# Patient Record
Sex: FEMALE | Race: BLACK OR AFRICAN AMERICAN | NOT HISPANIC OR LATINO | Employment: FULL TIME | URBAN - METROPOLITAN AREA
[De-identification: names, ages, dates, MRNs, and addresses within clinical notes are randomized per-mention and may not be internally consistent; named-entity substitution may affect disease eponyms.]

---

## 2017-08-02 ENCOUNTER — HOSPITAL ENCOUNTER (OUTPATIENT)
Dept: RADIOLOGY | Facility: OTHER | Age: 32
Discharge: HOME | End: 2017-08-02
Attending: SPECIALIST
Payer: COMMERCIAL

## 2017-08-02 DIAGNOSIS — D21.9 FIBROIDS: ICD-10-CM

## 2017-08-02 DIAGNOSIS — D21.9 FIBROIDS: Primary | ICD-10-CM

## 2017-08-04 ENCOUNTER — HOSPITAL ENCOUNTER (OUTPATIENT)
Dept: RADIOLOGY | Facility: OTHER | Age: 32
Discharge: HOME | End: 2017-08-04
Attending: SPECIALIST
Payer: COMMERCIAL

## 2017-08-04 DIAGNOSIS — D21.9 FIBROIDS: ICD-10-CM

## 2017-08-04 PROCEDURE — 76830 TRANSVAGINAL US NON-OB: CPT | Mod: 26,,, | Performed by: RADIOLOGY

## 2017-08-04 PROCEDURE — 76856 US EXAM PELVIC COMPLETE: CPT | Mod: TC

## 2017-08-04 PROCEDURE — 76856 US EXAM PELVIC COMPLETE: CPT | Mod: 26,,, | Performed by: RADIOLOGY

## 2017-08-31 ENCOUNTER — TELEPHONE (OUTPATIENT)
Dept: TRANSPLANT | Facility: CLINIC | Age: 32
End: 2017-08-31

## 2017-08-31 NOTE — TELEPHONE ENCOUNTER
----- Message from Keren Yu sent at 8/31/2017  9:04 AM CDT -----  Contact: patient   Calling to schedule her appt. Please call

## 2017-09-01 ENCOUNTER — TELEPHONE (OUTPATIENT)
Dept: TRANSPLANT | Facility: CLINIC | Age: 32
End: 2017-09-01

## 2017-09-01 NOTE — TELEPHONE ENCOUNTER
----- Message from Hanna Javed sent at 8/31/2017  4:18 PM CDT -----  Contact: Pt  Devora,    Pt would like to know if her records were received from Dr.Kelly Person's office?    Pt contact number [phone]  Thanks

## 2017-09-01 NOTE — TELEPHONE ENCOUNTER
----- Message from Hanna Javed sent at 9/1/2017  1:38 PM CDT -----  Contact: Pt  Pt would like to schedule an appt    Pt contact number [phone]  Thanks

## 2017-09-05 ENCOUNTER — DOCUMENTATION ONLY (OUTPATIENT)
Dept: TRANSPLANT | Facility: CLINIC | Age: 32
End: 2017-09-05

## 2017-09-05 NOTE — LETTER
September 5, 2017    Divina Ricci  [address] [facility] [address]      Dear Divina Ricci:    Your doctor has referred you to the [facility]. You will be contacted by our office and an initial appointment will then be scheduled for you.    We look forward to seeing you soon. If you have any further questions, please contact us at [phone].       Sincerely,        [facility]   [address] [place] [id number]  [phone]

## 2017-09-05 NOTE — NURSING
Pt records reviewed.  Pt will be referred to Hepatology due to cirrhosis with normal labs.   Initial referral received  from Rufina Person    Referral letter sent to provider and patient.  Pt records reviewed.

## 2017-10-03 ENCOUNTER — OFFICE VISIT (OUTPATIENT)
Dept: HEPATOLOGY | Facility: CLINIC | Age: 32
End: 2017-10-03
Payer: COMMERCIAL

## 2017-10-03 VITALS
RESPIRATION RATE: 18 BRPM | DIASTOLIC BLOOD PRESSURE: 103 MMHG | HEART RATE: 113 BPM | WEIGHT: 201.94 LBS | OXYGEN SATURATION: 99 % | TEMPERATURE: 98 F | HEIGHT: 66 IN | SYSTOLIC BLOOD PRESSURE: 155 MMHG | BODY MASS INDEX: 32.45 KG/M2

## 2017-10-03 DIAGNOSIS — R93.89 ABNORMAL FINDING ON IMAGING: Primary | ICD-10-CM

## 2017-10-03 PROCEDURE — 99999 PR PBB SHADOW E&M-EST. PATIENT-LVL IV: CPT | Mod: PBBFAC,,, | Performed by: INTERNAL MEDICINE

## 2017-10-03 PROCEDURE — 99204 OFFICE O/P NEW MOD 45 MIN: CPT | Mod: S$GLB,,, | Performed by: INTERNAL MEDICINE

## 2017-10-03 RX ORDER — NORETHINDRONE ACETATE AND ETHINYL ESTRADIOL 1MG-20(21)
1 KIT ORAL DAILY
Status: ON HOLD | COMMUNITY
End: 2019-03-11 | Stop reason: HOSPADM

## 2017-10-03 RX ORDER — LISINOPRIL 10 MG/1
10 TABLET ORAL DAILY
COMMUNITY
Start: 2017-09-27 | End: 2018-10-09

## 2017-10-03 RX ORDER — LEVOTHYROXINE SODIUM 200 UG/1
200 TABLET ORAL DAILY
COMMUNITY
End: 2018-10-09

## 2017-10-03 NOTE — PATIENT INSTRUCTIONS
Recommendations:  -  CBC, CMP in 3 months  -  U/s abd complete to evaluate for cirrhosis, ascites, focal lesion in the liver.   -  Fibroscan performed. Result as above.

## 2017-10-03 NOTE — PROGRESS NOTES
"[facility] New Patient (Self-Referral) Note    Reason for Visit:  The encounter diagnosis is "cirrhosis with ascites"    PCP:     HPI:  This is a 31 y.o. female here for evaluation of: "cirrhosis"      Patient had upper abdominal distention in the epigastric area about 3 months ago (July 2017), it would be constant, just became bigger after certain foods, like bread, or drank a lot of water.  She also had trouble emptying bowels.  She had a lot of constipation.  An ultrasound was done in the physician's office, which showed "evidence for cirrhosis with ascites".  She did not receive diuretics.  Was not on any particular diet.      She also had thyroid function tests and CBC, and was told she had hypothryoidism and anemia.  Was given synthroid 200 mcg daily before breakfast, currently takes it.  And she was sent to Dr. Lange, hematologist, for her anemia.  He gave her two IV iron infusions and oral iron.  Last time she went to him she was told to stop the iron because she had now too much iron.     Currently, feels much better, with more energy, constipation is gone, she can climb up stairs.  But epigastric fullness still persists.        Elevated liver enzymes: No  Abnormal imaging: Yes  Cirrhosis: Yes  Hepatitis C: No  Hepatitis B: No  Fatty liver: No  Encephalopathy: No  Post-hospital discharge: No  Symptoms: abdomen feels big in the epigastric area    Primary hepatic manifestations:  Fatigue:No  Edema:No  Ascites:No  Encephalopathy:No  Abdominal pain:No  GI bleeds: No  Pruritus:No  Weight Changes:No  Changes in Bowel habits: No  Muscle cramps:No    Risk factors for liver disease:  No jaundice  No transfusions  No IVDU  Did not snort cocaine or similar agents  Did not live with anyone with hepatitis B or C  Sexual partner not tested  No hepatotoxic medications  No exposure to industrial toxins  Alcohol: none       ROS:  Constitutional: No fevers, chills, weight changes, fatigue  ENT: No allergies, " nosebleeds,   CV: No chest pain  Pulm: No cough, shortness of breath  Ophtho: No vision changes  GI/Liver: see HPI  Derm: No rash, itching  Heme: No swollen glands, bruising  MSK: No joint pains, joint swelling  : No dysuria, hematuria, decrease in urine output  Endo: No hot or cold intolerance  Neuro: No confusion, disorientation, difficulty with sleep, memory, concentration, syncope, seizure  Psych: No anxiety, depression    Medical History:  has no past medical history on file.    Surgical History:  has no past surgical history on file.    Family History: family history is not on file..     Social History:  social    Current Outpatient Prescriptions   Medication Sig    Lactobacillus rhamnosus GG (CULTURELLE) 10 billion cell capsule Take 1 capsule by mouth once daily.    levothyroxine (SYNTHROID) 200 MCG tablet Take 200 mcg by mouth once daily.    lisinopril 10 MG tablet Take 10 mg by mouth once daily.     norethindrone-ethinyl estradiol (JUNEL FE 1/20) 1 mg-20 mcg (21)/75 mg (7) per tablet Take 1 tablet by mouth once daily.     No current facility-administered medications for this visit.        Objective Findings:    Vital Signs:  Vitals:    10/03/17 1429   BP: (!) 155/103   Pulse: (!) 113   Resp: 18   Temp: 97.9 °F (36.6 °C)           Physical Exam:  General Appearance: Well appearing in no acute distress  Head:   Normocephalic, without obvious abnormality  Eyes:    No scleral icterus, EOMI  ENT: Neck supple, Lips, mucosa, and tongue normal; teeth and gums normal  Lungs: CTA bilaterally in anterior and posterior fields, no wheezes, no crackles.  Heart:  Regular rate and rhythm, S1, S2 normal, no murmurs heard  Abdomen: Soft, non tender, non distended with positive bowel sounds in all four quadrants. No hepatosplenomegaly, ascites, or mass  Extremities: 2+ pulses, no clubbing, cyanosis or edema  Skin: No rash  Neurologic: CN II-XII intact      Labs:  No results found for this basename: wbc,  hgb,  hct,   "plt,  chol,  trig,  hdl,  ldldirect,  inr,  creatinine,  bun,  bilitot,  alt,  ast,  alkphos,  na,  k,  cl,  co2,  tsh,  psa,  gluf,  hgba1c,  microalbur,  afp       Imaging:   Pelvic ultrasound - enlarged uterus with multiple fibroids  Outside ultrasound: 8/2417: cirrhosis with ascites    Endoscopy:    none    Assessment:      -  Epigastric fullness, outside u/s reported "liver shows evidence for cirrhosis with ascites".  Liver chemistries completely normal.  Fullness persists but no increase after meals. Improved symptoms after thyroid replacement done.  I believe hypothryroid state caused her symptoms of fatigue, constipation, possibly gatsric emptying problem.  Doubt she has cirrhosis, as shown by fibroscan below.   Fibroscan today shows: kPa 7.1, 14% IQR/med, F0-F1 (closer to F0)  And  = S0, <11% fat   Father has autoimmune hepatitis, therefore, will recheck enzymes in 3 months.     -  Hypothyroidism much improved with supplemental synthroid.   kofi check fibroscan, and if indicates singinificant fibrosis or cirrhosis, will get autoimmune markers, her father hads autoimmune hepatitis.      Recommendations:  -  CBC, CMP in 3 months  -  U/s abd complete to evaluate for cirrhosis, ascites, focal lesion in the liver.   -  Fibroscan performed. Result as above.      No Follow-up on file.      Order summary:  No orders of the defined types were placed in this encounter.           Nancy Lam MD  "

## 2017-10-03 NOTE — Clinical Note
Recommendations: -  CBC, CMP in 3 months -  U/s abd complete to evaluate for cirrhosis, ascites, focal lesion in the liver.  -  Fibroscan performed. Result as above. -  Return in 6 months

## 2017-10-13 ENCOUNTER — HOSPITAL ENCOUNTER (OUTPATIENT)
Dept: RADIOLOGY | Facility: HOSPITAL | Age: 32
Discharge: HOME | End: 2017-10-13
Attending: INTERNAL MEDICINE
Payer: COMMERCIAL

## 2017-10-13 DIAGNOSIS — R93.89 ABNORMAL FINDING ON IMAGING: ICD-10-CM

## 2017-10-13 PROCEDURE — 76700 US EXAM ABDOM COMPLETE: CPT | Mod: TC

## 2017-10-13 PROCEDURE — 76700 US EXAM ABDOM COMPLETE: CPT | Mod: 26,,, | Performed by: RADIOLOGY

## 2017-10-15 ENCOUNTER — TELEPHONE (OUTPATIENT)
Dept: HEPATOLOGY | Facility: CLINIC | Age: 32
End: 2017-10-15

## 2017-10-15 DIAGNOSIS — R19.00 PELVIC MASS IN FEMALE: Primary | ICD-10-CM

## 2017-10-15 NOTE — TELEPHONE ENCOUNTER
Please inform patient, there is no evidence of cirrhosis based on this u/s, but there is a small polyp in the gallbladder, fluid in the abdomen and a pelvic mass which could be a fibroid or from the ovary.     Needs CA-125, CT of abd and pelvis, paracentesis with fluid analysis, and she needs to see her GYN doctor ASAP for pelvic mass, may need to stop hormone pills.  Pl schedule lab appt, CT, and paracentesis with IR and ascites fluid to be sent for cell count, diff, cytology, total protein, albumin, culture. Orders placed

## 2017-10-16 ENCOUNTER — TELEPHONE (OUTPATIENT)
Dept: HEPATOLOGY | Facility: CLINIC | Age: 32
End: 2017-10-16

## 2017-10-16 NOTE — TELEPHONE ENCOUNTER
MA called patient to inform her that we have schedule her PARA, CT and labs 10/23/17 all in the same day. Patient accepted the appt. Remind patient to fast 4 hours. Patient understood. HENRY

## 2017-10-16 NOTE — TELEPHONE ENCOUNTER
----- Message from Nancy Lam MD sent at 10/15/2017  5:14 AM CDT -----  Please inform patient, there is no evidence of cirrhosis based on this u/s, but there is a small polyp in the gallbladder, fluid in the abdomen and a pelvic mass which could be a fibroid or from the ovary. Needs CA-125, CT of abd and pelvis, paracentesis with fluid analysis, and she needs to see her GYN doctor.  Pl schedule paracentesis with IR and ascites fluid to be sent for cell count, diff, cytology, total protein, albumin, culture.

## 2017-10-16 NOTE — TELEPHONE ENCOUNTER
MA called patient of her Ultrasound results below. Patient understood she would like to schedule her PARA, CT and labs all in the same day.     She will also see her OB GYN.     MA called IR to schedule her PARA> no answer left them VM to please call me back. HENRY

## 2017-10-19 DIAGNOSIS — R18.8 OTHER ASCITES: Primary | ICD-10-CM

## 2017-10-20 DIAGNOSIS — Z01.818 PRE-PROCEDURAL EXAMINATION: Primary | ICD-10-CM

## 2017-10-23 ENCOUNTER — HOSPITAL ENCOUNTER (OUTPATIENT)
Dept: RADIOLOGY | Facility: HOSPITAL | Age: 32
Discharge: HOME | End: 2017-10-23
Attending: INTERNAL MEDICINE
Payer: COMMERCIAL

## 2017-10-23 ENCOUNTER — TELEPHONE (OUTPATIENT)
Dept: HEPATOLOGY | Facility: CLINIC | Age: 32
End: 2017-10-23

## 2017-10-23 ENCOUNTER — HOSPITAL ENCOUNTER (OUTPATIENT)
Dept: INTERVENTIONAL RADIOLOGY/VASCULAR | Facility: HOSPITAL | Age: 32
Discharge: HOME | End: 2017-10-23
Attending: INTERNAL MEDICINE
Payer: COMMERCIAL

## 2017-10-23 VITALS
OXYGEN SATURATION: 100 % | RESPIRATION RATE: 16 BRPM | HEART RATE: 105 BPM | SYSTOLIC BLOOD PRESSURE: 147 MMHG | DIASTOLIC BLOOD PRESSURE: 88 MMHG

## 2017-10-23 DIAGNOSIS — R19.00 PELVIC MASS IN FEMALE: ICD-10-CM

## 2017-10-23 LAB
ALBUMIN FLD-MCNC: 3.1 G/DL
APPEARANCE FLD: NORMAL
B-HCG UR QL: NEGATIVE
BODY FLD TYPE: NORMAL
COLOR FLD: NORMAL
CREAT SERPL-MCNC: 0.7 MG/DL (ref 0.5–1.4)
CTP QC/QA: YES
EOSINOPHIL NFR FLD MANUAL: 1 %
LYMPHOCYTES NFR FLD MANUAL: 43 %
MESOTHL CELL NFR FLD MANUAL: 3 %
MONOS+MACROS NFR FLD MANUAL: 52 %
NEUTROPHILS NFR FLD MANUAL: 1 %
PROT FLD-MCNC: 6.1 G/DL
SAMPLE: NORMAL
SPECIMEN SOURCE: NORMAL
SPECIMEN SOURCE: NORMAL
WBC # FLD: 235 /CU MM

## 2017-10-23 PROCEDURE — 88112 CYTOPATH CELL ENHANCE TECH: CPT | Mod: 26,,, | Performed by: PATHOLOGY

## 2017-10-23 PROCEDURE — 89051 BODY FLUID CELL COUNT: CPT

## 2017-10-23 PROCEDURE — 25500020 PHARM REV CODE 255: Performed by: INTERNAL MEDICINE

## 2017-10-23 PROCEDURE — 49083 ABD PARACENTESIS W/IMAGING: CPT | Mod: ,,, | Performed by: FAMILY MEDICINE

## 2017-10-23 PROCEDURE — C1729 CATH, DRAINAGE: HCPCS

## 2017-10-23 PROCEDURE — 82042 OTHER SOURCE ALBUMIN QUAN EA: CPT

## 2017-10-23 PROCEDURE — 88305 TISSUE EXAM BY PATHOLOGIST: CPT | Performed by: PATHOLOGY

## 2017-10-23 PROCEDURE — 88305 TISSUE EXAM BY PATHOLOGIST: CPT | Mod: 26,,, | Performed by: PATHOLOGY

## 2017-10-23 PROCEDURE — 74177 CT ABD & PELVIS W/CONTRAST: CPT | Mod: 26,,, | Performed by: INTERNAL MEDICINE

## 2017-10-23 PROCEDURE — A7048 VACUUM DRAIN BOTTLE/TUBE KIT: HCPCS

## 2017-10-23 PROCEDURE — 87070 CULTURE OTHR SPECIMN AEROBIC: CPT

## 2017-10-23 PROCEDURE — 84157 ASSAY OF PROTEIN OTHER: CPT

## 2017-10-23 PROCEDURE — 74177 CT ABD & PELVIS W/CONTRAST: CPT | Mod: TC

## 2017-10-23 RX ADMIN — IOHEXOL 15 ML: 350 INJECTION, SOLUTION INTRAVENOUS at 03:10

## 2017-10-23 RX ADMIN — IOHEXOL 100 ML: 350 INJECTION, SOLUTION INTRAVENOUS at 04:10

## 2017-10-23 NOTE — H&P
Radiology History & Physical      SUBJECTIVE:     Chief Complaint: ascites     History of Present Illness:  Divina Ricci is a 31 y.o. female who presents for ultrasound guided paracentesis  Past Medical History:   Diagnosis Date    Anemia     Hypothyroid      No past surgical history on file.    Home Meds:   Prior to Admission medications    Medication Sig Start Date End Date Taking? Authorizing Provider   Lactobacillus rhamnosus GG (CULTURELLE) 10 billion cell capsule Take 1 capsule by mouth once daily.    Historical Provider, MD   levothyroxine (SYNTHROID) 200 MCG tablet Take 200 mcg by mouth once daily.    Historical Provider, MD   lisinopril 10 MG tablet Take 10 mg by mouth once daily.  9/27/17   Historical Provider, MD   norethindrone-ethinyl estradiol (JUNEL FE 1/20) 1 mg-20 mcg (21)/75 mg (7) per tablet Take 1 tablet by mouth once daily.    Historical Provider, MD     Anticoagulants/Antiplatelets: no anticoagulation    Allergies: Review of patient's allergies indicates:  No Known Allergies  Sedation History:  no adverse reactions    Review of Systems:   Hematological: no known coagulopathies  Respiratory: no shortness of breath  Cardiovascular: no chest pain  Gastrointestinal: no abdominal pain  Genito-Urinary: no dysuria  Musculoskeletal: negative  Neurological: no TIA or stroke symptoms         OBJECTIVE:     Vital Signs (Most Recent)  Pulse: (!) 122 (10/23/17 1118)  Resp: 18 (10/23/17 1118)  BP: (!) 143/97 (10/23/17 1118)  SpO2: 100 % (10/23/17 1118)    Physical Exam:  ASA: 2  Mallampati: n/a    General: no acute distress  Mental Status: alert and oriented to person, place and time  HEENT: normocephalic, atraumatic  Chest: unlabored breathing  Heart: regular heart rate  Abdomen: distended  Extremity: moves all extremities    Laboratory  Lab Results   Component Value Date    INR 1.0 10/23/2017       Lab Results   Component Value Date    WBC 3.98 10/23/2017    HGB 12.7 10/23/2017    HCT 39.2  10/23/2017    MCV 80 (L) 10/23/2017     (H) 10/23/2017    No results found for: GLU, NA, K, CL, CO2, BUN, CREATININE, CALCIUM, MG, ALT, AST, ALBUMIN, BILITOT, BILIDIR    ASSESSMENT/PLAN:     Sedation Plan: local  Patient will undergo ultrasound guided paracentesis.    SMITH Agrawal, FNP  Interventional Radiology  [phone] [employer]

## 2017-10-23 NOTE — PROCEDURES
Radiology Post-Procedure Note    Pre Op Diagnosis: Ascites  Post Op Diagnosis: Same    Procedure: Ultrasound Guided Paracentesis    Procedure performed by: Nikki DAVIDSON, Shyanne     Written Informed Consent Obtained: Yes  Specimen Removed: YES bloody  Estimated Blood Loss: Minimal    Findings:   Successful paracentesis.  Albumin administered PRN per protocol.    Patient tolerated procedure well.    Shyanne Jordan, APRN, FNP  Interventional Radiology  [phone] [employer]

## 2017-10-23 NOTE — PROGRESS NOTES
Paracentesis complete, 4100 MLs removed. Specimen sent to lab. l. Dressing applied to Right abdomen puncture site, dressing clean dry and intact. Pt given discharge instructions and handouts, pt verbalizes understanding. Questions answered. Pt denies pain and discomfort. Pt refusing transport. Pt ambulated to [facility] for transport home with family. Gait steady.

## 2017-10-24 ENCOUNTER — TELEPHONE (OUTPATIENT)
Dept: HEPATOLOGY | Facility: CLINIC | Age: 32
End: 2017-10-24

## 2017-10-24 NOTE — TELEPHONE ENCOUNTER
----- Message from Nancy Lam MD sent at 10/23/2017 10:51 PM CDT -----  Tumor marker just above normal, not significant.

## 2017-10-25 ENCOUNTER — TELEPHONE (OUTPATIENT)
Dept: HEPATOLOGY | Facility: CLINIC | Age: 32
End: 2017-10-25

## 2017-10-25 NOTE — TELEPHONE ENCOUNTER
----- Message from Nancy Lam MD sent at 10/23/2017 10:47 PM CDT -----  Pl inform pt:  Abdominal fluid was blood tinged. Cell counts not high enough to call bacterial peritonitis (bacterial infection in the abdomen). Cytology not reported, pending looks like.

## 2017-10-27 ENCOUNTER — TELEPHONE (OUTPATIENT)
Dept: HEPATOLOGY | Facility: CLINIC | Age: 32
End: 2017-10-27

## 2017-10-27 NOTE — TELEPHONE ENCOUNTER
----- Message from Nancy Lam MD sent at 10/27/2017  5:40 AM CDT -----  Fluid: Neg culture   Pl inform pt;  No infection in the fluid.   Cytology pending.

## 2017-10-27 NOTE — TELEPHONE ENCOUNTER
----- Message from Nancy Lam MD sent at 10/27/2017  4:56 AM CDT -----  Pl inform pt: CT showed uterine fibroids, and fluid.  Please have her see her GYN doctor to see if they think fluid could be due to fibroids or is there another reason.  Liver looks OK, don't think it is causing fluid.

## 2017-10-27 NOTE — TELEPHONE ENCOUNTER
----- Message from José Juárez sent at 10/26/2017  2:44 PM CDT -----  Contact: pt  Pt called to get CT exam results     [phone]    Thanks

## 2017-10-27 NOTE — TELEPHONE ENCOUNTER
Pt informed of this today and she is seeing her GYN doc next Tuesday and will mention this over to them at that time.

## 2017-10-28 LAB — BACTERIA FLD CULT: NORMAL

## 2017-10-31 ENCOUNTER — TELEPHONE (OUTPATIENT)
Dept: HEPATOLOGY | Facility: CLINIC | Age: 32
End: 2017-10-31

## 2017-10-31 DIAGNOSIS — D21.9 FIBROIDS: ICD-10-CM

## 2017-10-31 DIAGNOSIS — N83.00 FOLLICULAR CYST OF OVARY: Primary | ICD-10-CM

## 2017-10-31 NOTE — TELEPHONE ENCOUNTER
----- Message from Nancy Lam MD sent at 10/30/2017  9:32 PM CDT -----  Pl inform pt:  Fluid not infected.  Cytology of fluid still in process.

## 2017-11-03 ENCOUNTER — HOSPITAL ENCOUNTER (OUTPATIENT)
Dept: RADIOLOGY | Facility: OTHER | Age: 32
Discharge: HOME | End: 2017-11-03
Attending: SPECIALIST
Payer: COMMERCIAL

## 2017-11-03 DIAGNOSIS — N83.00 FOLLICULAR CYST OF OVARY: ICD-10-CM

## 2017-11-03 DIAGNOSIS — D21.9 FIBROIDS: ICD-10-CM

## 2017-11-03 PROCEDURE — 76856 US EXAM PELVIC COMPLETE: CPT | Mod: TC

## 2017-11-03 PROCEDURE — 76856 US EXAM PELVIC COMPLETE: CPT | Mod: 26,,, | Performed by: RADIOLOGY

## 2017-11-03 PROCEDURE — 76830 TRANSVAGINAL US NON-OB: CPT | Mod: 26,,, | Performed by: RADIOLOGY

## 2017-11-15 ENCOUNTER — HOSPITAL ENCOUNTER (OUTPATIENT)
Dept: RADIOLOGY | Facility: OTHER | Age: 32
Discharge: HOME | End: 2017-11-15
Attending: SPECIALIST
Payer: COMMERCIAL

## 2017-11-15 DIAGNOSIS — D25.9 UTERINE FIBROID: ICD-10-CM

## 2017-11-15 PROCEDURE — 25500020 PHARM REV CODE 255: Performed by: SPECIALIST

## 2017-11-15 PROCEDURE — 72197 MRI PELVIS W/O & W/DYE: CPT | Mod: TC

## 2017-11-15 PROCEDURE — 72197 MRI PELVIS W/O & W/DYE: CPT | Mod: 26,,, | Performed by: RADIOLOGY

## 2017-11-15 PROCEDURE — A9585 GADOBUTROL INJECTION: HCPCS | Performed by: SPECIALIST

## 2017-11-15 RX ORDER — GADOBUTROL 604.72 MG/ML
9 INJECTION INTRAVENOUS
Status: COMPLETED | OUTPATIENT
Start: 2017-11-15 | End: 2017-11-15

## 2017-11-15 RX ADMIN — GADOBUTROL 9 ML: 604.72 INJECTION INTRAVENOUS at 10:11

## 2018-01-29 ENCOUNTER — TELEPHONE (OUTPATIENT)
Dept: HEPATOLOGY | Facility: CLINIC | Age: 33
End: 2018-01-29

## 2018-01-30 ENCOUNTER — TELEPHONE (OUTPATIENT)
Dept: HEPATOLOGY | Facility: CLINIC | Age: 33
End: 2018-01-30

## 2018-01-30 NOTE — TELEPHONE ENCOUNTER
MA called patient again she still unable to reached left her VM to please give us a callback. HENRY

## 2018-01-30 NOTE — TELEPHONE ENCOUNTER
----- Message from Nancy Lam MD sent at 1/26/2018  9:53 AM CST -----  Pl inform patient the acites fluid cytology report from 10/26/17 was sent to me today, not sure why it's taken so long, but good news is: there were no cancer cells in the fluid.

## 2018-01-31 ENCOUNTER — TELEPHONE (OUTPATIENT)
Dept: HEPATOLOGY | Facility: CLINIC | Age: 33
End: 2018-01-31

## 2018-01-31 NOTE — TELEPHONE ENCOUNTER
Message from Dr Lam;  MD PAWEL James [facility] Clinical Staff   Pl inform patient the acites fluid cytology report from 10/26/17 was sent to me today, not sure why it's taken so long, but good news is: there were no cancer cells in the fluid.      Patient called and message relayed from Dr Lam.   Patient stated she was glad to get the good news. Voiced understanding.

## 2018-08-07 ENCOUNTER — TELEPHONE (OUTPATIENT)
Dept: HEPATOLOGY | Facility: CLINIC | Age: 33
End: 2018-08-07

## 2018-08-07 NOTE — TELEPHONE ENCOUNTER
----- Message from Jasmin Hahn sent at 8/3/2018 11:37 AM CDT -----  Contact: emily Frederick  Patient was discharge from the hospital on 08/02/2018 and was told to schedule appointment with her Dr Lam patient may need biopsy done.                                     Thanks,  Jasmin  ----- Message -----  From: Tracie Sharpe  Sent: 8/3/2018  11:22 AM  To: Hepatology Scheduling    Needs Advice    Reason for call: called to schedule appt  Communication Preference: [phone]  Additional Information: pt

## 2018-10-09 ENCOUNTER — TELEPHONE (OUTPATIENT)
Dept: HEPATOLOGY | Facility: CLINIC | Age: 33
End: 2018-10-09

## 2018-10-09 ENCOUNTER — OFFICE VISIT (OUTPATIENT)
Dept: HEPATOLOGY | Facility: CLINIC | Age: 33
End: 2018-10-09
Payer: COMMERCIAL

## 2018-10-09 VITALS
BODY MASS INDEX: 29.83 KG/M2 | WEIGHT: 190.06 LBS | HEIGHT: 67 IN | DIASTOLIC BLOOD PRESSURE: 80 MMHG | OXYGEN SATURATION: 100 % | HEART RATE: 119 BPM | SYSTOLIC BLOOD PRESSURE: 132 MMHG

## 2018-10-09 DIAGNOSIS — D21.9 FIBROIDS: ICD-10-CM

## 2018-10-09 DIAGNOSIS — E03.9 HYPOTHYROIDISM, UNSPECIFIED TYPE: ICD-10-CM

## 2018-10-09 DIAGNOSIS — R18.8 OTHER ASCITES: Primary | ICD-10-CM

## 2018-10-09 PROCEDURE — 99213 OFFICE O/P EST LOW 20 MIN: CPT | Mod: S$GLB,,, | Performed by: INTERNAL MEDICINE

## 2018-10-09 PROCEDURE — 99999 PR PBB SHADOW E&M-EST. PATIENT-LVL III: CPT | Mod: PBBFAC,,, | Performed by: INTERNAL MEDICINE

## 2018-10-09 RX ORDER — METOPROLOL SUCCINATE 25 MG/1
25 TABLET, EXTENDED RELEASE ORAL 2 TIMES DAILY
Refills: 0 | COMMUNITY
Start: 2018-09-26 | End: 2019-02-26 | Stop reason: CLARIF

## 2018-10-09 RX ORDER — LEVOTHYROXINE SODIUM 137 UG/1
137 TABLET ORAL DAILY
Refills: 0 | COMMUNITY
Start: 2018-08-28 | End: 2019-04-11 | Stop reason: DRUGHIGH

## 2018-10-09 RX ORDER — AMLODIPINE BESYLATE 5 MG/1
5 TABLET ORAL NIGHTLY
Refills: 1 | COMMUNITY
Start: 2018-09-24

## 2018-10-09 NOTE — PATIENT INSTRUCTIONS
Recommendations:  -  CBC, CMP in 3 months  -  TJ liver biopsy with venous pressure measuremnets to evaluate for cirrhosis, ascites.   - return in 4 weeks

## 2018-10-09 NOTE — PROGRESS NOTES
"[facility] New Patient (Self-Referral) Note    Reason for Visit:  The encounter diagnosis is "cirrhosis with ascites"    PCP:     Interval history:  Patient had presented with ascites in 2017, which was not consistent with portal hypertension. Fibroscan showed minimal fibrosis, platelet count was normal.  Ascites had more lymphocytes than polymorphs.  I had sent her to the gyn mD because of fibroids. Ascites was drained and did npot return till June 2018.   Subsequently she was seen by her endocrinologist for hypothyroid, during that visit, she was noted to be tachycardic.  Was admitted to [facility] on 8/1/2018 for supraventricular tachycardia, was taken off liothyronine but continued synthroid.  Also was started on metoprolol.  On a long term basis, has been on amlodipine, birth control pills.       At [facility], Ascites was drained and fluid again was not infected.  She is sent here for a liver biopsy.    HPI:  This is a 31 y.o. female here for evaluation of: "cirrhosis"      Patient had upper abdominal distention in the epigastric area about 3 months ago (July 2017), it would be constant, just became bigger after certain foods, like bread, or drank a lot of water.  She also had trouble emptying bowels.  She had a lot of constipation.  An ultrasound was done in the physician's office, which showed "evidence for cirrhosis with ascites".  She did not receive diuretics.  Was not on any particular diet.      She also had thyroid function tests and CBC, and was told she had hypothryoidism and anemia.  Was given synthroid 200 mcg daily before breakfast, currently takes it.  And she was sent to Dr. Lange, hematologist, for her anemia.  He gave her two IV iron infusions and oral iron.  Last time she went to him she was told to stop the iron because she had now too much iron.     Currently, feels much better, with more energy, constipation is gone, she can climb up stairs.  But epigastric fullness still persists.  "       Elevated liver enzymes: No  Abnormal imaging: Yes  Cirrhosis: Yes  Hepatitis C: No  Hepatitis B: No  Fatty liver: No  Encephalopathy: No  Post-hospital discharge: No  Symptoms: abdomen feels big in the epigastric area    Primary hepatic manifestations:  Fatigue:No  Edema:No  Ascites:No  Encephalopathy:No  Abdominal pain:No  GI bleeds: No  Pruritus:No  Weight Changes:No  Changes in Bowel habits: No  Muscle cramps:No    Risk factors for liver disease:  No jaundice  No transfusions  No IVDU  Did not snort cocaine or similar agents  Did not live with anyone with hepatitis B or C  Sexual partner not tested  No hepatotoxic medications  No exposure to industrial toxins  Alcohol: none       ROS:  Constitutional: No fevers, chills, weight changes, fatigue  ENT: No allergies, nosebleeds,   CV: No chest pain  Pulm: No cough, shortness of breath  Ophtho: No vision changes  GI/Liver: see HPI  Derm: No rash, itching  Heme: No swollen glands, bruising  MSK: No joint pains, joint swelling  : No dysuria, hematuria, decrease in urine output  Endo: No hot or cold intolerance  Neuro: No confusion, disorientation, difficulty with sleep, memory, concentration, syncope, seizure  Psych: No anxiety, depression    Medical History:  has no past medical history on file.    Surgical History:  has no past surgical history on file.    Family History: family history is not on file..     Social History:  social    Current Outpatient Medications   Medication Sig    norethindrone-ethinyl estradiol (JUNEL FE 1/20) 1 mg-20 mcg (21)/75 mg (7) per tablet Take 1 tablet by mouth once daily.    amLODIPine (NORVASC) 5 MG tablet Take 5 mg by mouth every evening.    levothyroxine (SYNTHROID) 137 MCG Tab tablet Take 137 mcg by mouth once daily.    metoprolol succinate (TOPROL-XL) 25 MG 24 hr tablet Take 25 mg by mouth 2 (two) times daily.     No current facility-administered medications for this visit.        Objective Findings:    Vital  "Signs:  Vitals:    10/09/18 1437   BP: 132/80   Pulse: (!) 119           Physical Exam:  General Appearance: Well appearing in no acute distress  Head:   Normocephalic, without obvious abnormality  Eyes:    No scleral icterus, EOMI  ENT: Neck supple, Lips, mucosa, and tongue normal; teeth and gums normal  Lungs: CTA bilaterally in anterior and posterior fields, no wheezes, no crackles.  Heart:  Regular rate and rhythm, S1, S2 normal, no murmurs heard  Abdomen: Soft, non tender, non distended with positive bowel sounds in all four quadrants. No hepatosplenomegaly, ascites, or mass  Extremities: 2+ pulses, no clubbing, cyanosis or edema  Skin: No rash  Neurologic: CN II-XII intact      Labs:  No results found for this basename: wbc,  hgb,  hct,  plt,  chol,  trig,  hdl,  ldldirect,  inr,  creatinine,  bun,  bilitot,  alt,  ast,  alkphos,  na,  k,  cl,  co2,  tsh,  psa,  gluf,  hgba1c,  microalbur,  afp       Imaging:   Pelvic ultrasound - enlarged uterus with multiple fibroids  Outside ultrasound: 8/2417: cirrhosis with ascites    Endoscopy:    none    Assessment:      -  Patient has had two episodes of ascites needing paracenteses.  Fluid was not consistent with cirrhosis.  Also, Elastography only showed minimal to no hepatic fibrosis.  Outside u/s reported "liver shows evidence for cirrhosis with ascites".  Liver chemistries completely normal.   Improved symptoms after thyroid replacement done.  I believe hypothryroid state may have caused her symptoms of fatigue, constipation, possibly gatsric emptying problem.  Doubt she has cirrhosis, as shown by fibroscan below.   Fibroscan today shows: kPa 7.1, 14% IQR/med, F0-F1 (closer to F0)  And  = S0, <11% fat     Patient here for a liver biopsy.      Father has autoimmune hepatitis, therefore, will recheck enzymes in 3 months.     -  Hypothyroidism much improved with supplemental synthroid.   If indicates singinificant fibrosis or cirrhosis, will get autoimmune " markers, her father hads autoimmune hepatitis.      Recommendations:  -  CBC, CMP in 3 months  -  TJ liver biopsy with venous pressure measuremnets to evaluate for cirrhosis, ascites.   - return in 4 weeks      Order summary:  No orders of the defined types were placed in this encounter.           Nancy Lam MD

## 2018-10-09 NOTE — Clinical Note
Recommendations:-  CBC, CMP in 3 months-  TJ liver biopsy with venous pressure measuremnets to evaluate for cirrhosis, ascites. - return in 4 weeks

## 2018-10-09 NOTE — TELEPHONE ENCOUNTER
Patient seen in clinic today 10/9/18 with Dr Nancy Lam. The patient will need a TJ Liver Biopsy.  Patient agreed to do the Procedure.  Pre and Post Procedure teaching done with the patient and her mother.  Written instructions given to the patient also.   Allowed to verbalize concerns. Questions answered.  The patient would like to do the Liver Biopsy done on any Thurs starting next week on 10/18/18 at the earliest check in for 6:30 am.  Verbalized understanding.  Voicemail Message left with IR for a tentative appt date.

## 2018-10-10 ENCOUNTER — TELEPHONE (OUTPATIENT)
Dept: HEPATOLOGY | Facility: CLINIC | Age: 33
End: 2018-10-10

## 2018-10-10 DIAGNOSIS — R18.8 OTHER ASCITES: Primary | ICD-10-CM

## 2018-10-10 NOTE — TELEPHONE ENCOUNTER
Received call from IR with approval for the patients requested date to do the TJ Liver Biopsy. Thursday 10/18/18 with Check in at 6:30 am.    Patient called. Date and time is acceptable. Pre Procedure teaching reinforced. Verbalized understanding and agreed.  Appt letter placed in the mail.

## 2018-10-16 ENCOUNTER — TELEPHONE (OUTPATIENT)
Dept: HEPATOLOGY | Facility: CLINIC | Age: 33
End: 2018-10-16

## 2018-10-17 ENCOUNTER — TELEPHONE (OUTPATIENT)
Dept: INTERVENTIONAL RADIOLOGY/VASCULAR | Facility: HOSPITAL | Age: 33
End: 2018-10-17

## 2018-10-17 VITALS — BODY MASS INDEX: 29.66 KG/M2 | HEIGHT: 67 IN | WEIGHT: 189 LBS

## 2018-10-18 ENCOUNTER — HOSPITAL ENCOUNTER (OUTPATIENT)
Facility: HOSPITAL | Age: 33
Discharge: HOME | End: 2018-10-18
Attending: INTERNAL MEDICINE | Admitting: INTERNAL MEDICINE
Payer: COMMERCIAL

## 2018-10-18 VITALS
BODY MASS INDEX: 29.66 KG/M2 | SYSTOLIC BLOOD PRESSURE: 116 MMHG | HEART RATE: 72 BPM | DIASTOLIC BLOOD PRESSURE: 86 MMHG | WEIGHT: 189 LBS | OXYGEN SATURATION: 100 % | RESPIRATION RATE: 18 BRPM | HEIGHT: 67 IN | TEMPERATURE: 98 F

## 2018-10-18 DIAGNOSIS — R18.8 OTHER ASCITES: ICD-10-CM

## 2018-10-18 DIAGNOSIS — R16.0 LIVER MASS: ICD-10-CM

## 2018-10-18 LAB
BASOPHILS # BLD AUTO: 0.05 K/UL
BASOPHILS NFR BLD: 1.3 %
DIFFERENTIAL METHOD: ABNORMAL
EOSINOPHIL # BLD AUTO: 0.2 K/UL
EOSINOPHIL NFR BLD: 4.9 %
ERYTHROCYTE [DISTWIDTH] IN BLOOD BY AUTOMATED COUNT: 16 %
HCT VFR BLD AUTO: 37.9 %
HGB BLD-MCNC: 11.5 G/DL
IMM GRANULOCYTES # BLD AUTO: 0.01 K/UL
IMM GRANULOCYTES NFR BLD AUTO: 0.3 %
INR PPP: 1
LYMPHOCYTES # BLD AUTO: 2 K/UL
LYMPHOCYTES NFR BLD: 51.5 %
MCH RBC QN AUTO: 23.1 PG
MCHC RBC AUTO-ENTMCNC: 30.3 G/DL
MCV RBC AUTO: 76 FL
MONOCYTES # BLD AUTO: 0.4 K/UL
MONOCYTES NFR BLD: 9 %
NEUTROPHILS # BLD AUTO: 1.3 K/UL
NEUTROPHILS NFR BLD: 33 %
NRBC BLD-RTO: 0 /100 WBC
PLATELET # BLD AUTO: 434 K/UL
PMV BLD AUTO: 10.1 FL
PROTHROMBIN TIME: 10.5 SEC
RBC # BLD AUTO: 4.98 M/UL
WBC # BLD AUTO: 3.88 K/UL

## 2018-10-18 PROCEDURE — 88307 TISSUE EXAM BY PATHOLOGIST: CPT | Performed by: PATHOLOGY

## 2018-10-18 PROCEDURE — 88313 SPECIAL STAINS GROUP 2: CPT | Performed by: PATHOLOGY

## 2018-10-18 PROCEDURE — 85025 COMPLETE CBC W/AUTO DIFF WBC: CPT

## 2018-10-18 PROCEDURE — 25000003 PHARM REV CODE 250: Performed by: NURSE PRACTITIONER

## 2018-10-18 PROCEDURE — 85610 PROTHROMBIN TIME: CPT

## 2018-10-18 PROCEDURE — 88313 SPECIAL STAINS GROUP 2: CPT | Mod: 26,,, | Performed by: PATHOLOGY

## 2018-10-18 PROCEDURE — 88307 TISSUE EXAM BY PATHOLOGIST: CPT | Mod: 26,,, | Performed by: PATHOLOGY

## 2018-10-18 PROCEDURE — 63600175 PHARM REV CODE 636 W HCPCS: Performed by: RADIOLOGY

## 2018-10-18 RX ORDER — HEPARIN SODIUM 200 [USP'U]/100ML
500 INJECTION, SOLUTION INTRAVENOUS CONTINUOUS
Status: DISCONTINUED | OUTPATIENT
Start: 2018-10-18 | End: 2018-10-18 | Stop reason: HOSPADM

## 2018-10-18 RX ORDER — FENTANYL CITRATE 50 UG/ML
INJECTION, SOLUTION INTRAMUSCULAR; INTRAVENOUS CODE/TRAUMA/SEDATION MEDICATION
Status: COMPLETED | OUTPATIENT
Start: 2018-10-18 | End: 2018-10-18

## 2018-10-18 RX ORDER — MIDAZOLAM HYDROCHLORIDE 1 MG/ML
INJECTION INTRAMUSCULAR; INTRAVENOUS CODE/TRAUMA/SEDATION MEDICATION
Status: COMPLETED | OUTPATIENT
Start: 2018-10-18 | End: 2018-10-18

## 2018-10-18 RX ORDER — SODIUM CHLORIDE 9 MG/ML
500 INJECTION, SOLUTION INTRAVENOUS ONCE
Status: COMPLETED | OUTPATIENT
Start: 2018-10-18 | End: 2018-10-18

## 2018-10-18 RX ADMIN — FENTANYL CITRATE 50 MCG: 50 INJECTION, SOLUTION INTRAMUSCULAR; INTRAVENOUS at 09:10

## 2018-10-18 RX ADMIN — MIDAZOLAM HYDROCHLORIDE 2 MG: 1 INJECTION, SOLUTION INTRAMUSCULAR; INTRAVENOUS at 09:10

## 2018-10-18 RX ADMIN — SODIUM CHLORIDE 500 ML: 0.9 INJECTION, SOLUTION INTRAVENOUS at 07:10

## 2018-10-18 NOTE — SEDATION DOCUMENTATION
Pt to Interventional Radiology room 189 via stretcher for Transjugular liver biopsy. Pt transferred to procedure table in the supine position. No complaints of pain or discomfort at this time. Procedure site (right neck) prepped by JOE Law RTR. Will continue to monitor.

## 2018-10-18 NOTE — DISCHARGE SUMMARY
Radiology Discharge Summary      Hospital Course: No complications    Admit Date: 10/18/2018  Discharge Date: 10/18/2018     Instructions Given to Patient: Yes  Diet: Resume prior diet  Activity: activity as tolerated    Description of Condition on Discharge: Stable  Vital Signs (Most Recent): Temp: 98.3 °F (36.8 °C) (10/18/18 0700)  Pulse: 72 (10/18/18 1045)  Resp: 18 (10/18/18 1045)  BP: 116/86 (10/18/18 1045)  SpO2: 100 % (10/18/18 1045)    Discharge Disposition: Home    Discharge Diagnosis: liver dysfunction     Follow-up: per referring    @SIG@

## 2018-10-18 NOTE — PROGRESS NOTES
Discharge instructions reviewed with pt & mother, both verbalize understanding.  Instructions include medications, self/site care, and when to call a physician.  IV removed, DSD applied.  Pt refuses wheelchair, amb to lobby.

## 2018-10-18 NOTE — H&P
Radiology History & Physical      SUBJECTIVE:     Chief Complaint: History of ascites with outside ultrasound showing evidence for cirrhosis.    History of Present Illness:  Divina Ricci is a 32 y.o. female who presents for image guided transjugular liver biopsy to evaluate for cirrhosis.  Past Medical History:   Diagnosis Date    Anemia     Ascites     Cirrhosis     Hypertension     Hypothyroid      History reviewed. No pertinent surgical history.    Home Meds:   Prior to Admission medications    Medication Sig Start Date End Date Taking? Authorizing Provider   amLODIPine (NORVASC) 5 MG tablet Take 5 mg by mouth every evening. 9/24/18  Yes Historical Provider, MD   levothyroxine (SYNTHROID) 137 MCG Tab tablet Take 137 mcg by mouth once daily. 8/28/18  Yes Historical Provider, MD   metoprolol succinate (TOPROL-XL) 25 MG 24 hr tablet Take 25 mg by mouth 2 (two) times daily. 9/26/18  Yes Historical Provider, MD   norethindrone-ethinyl estradiol (JUNEL FE 1/20) 1 mg-20 mcg (21)/75 mg (7) per tablet Take 1 tablet by mouth once daily.   Yes Historical Provider, MD     Anticoagulants/Antiplatelets: no anticoagulation    Allergies:   Review of patient's allergies indicates:   Allergen Reactions    Lisinopril Swelling     Swollen Mouth     Sedation History:  no adverse reactions    Review of Systems:   Hematological: no known coagulopathies  Respiratory: no shortness of breath  Cardiovascular: no chest pain  Gastrointestinal: no abdominal pain  Genito-Urinary: no dysuria  Musculoskeletal: negative  Neurological: no TIA or stroke symptoms         OBJECTIVE:     Vital Signs (Most Recent)  Temp: 98.3 °F (36.8 °C) (10/18/18 0700)  Pulse: 110 (10/18/18 0700)  Resp: 18 (10/18/18 0700)  BP: (!) 140/92 (10/18/18 0700)  SpO2: 100 % (10/18/18 0700)    Physical Exam:  ASA: 2  Mallampati: 2    General: no acute distress  Mental Status: alert and oriented to person, place and time  HEENT: normocephalic,  atraumatic  Chest: unlabored breathing  Heart: regular heart rate  Abdomen: nondistended  Extremity: moves all extremities    Laboratory  Lab Results   Component Value Date    INR 1.0 10/18/2018       Lab Results   Component Value Date    WBC 3.88 (L) 10/18/2018    HGB 11.5 (L) 10/18/2018    HCT 37.9 10/18/2018    MCV 76 (L) 10/18/2018     (H) 10/18/2018    No results found for: GLU, NA, K, CL, CO2, BUN, CREATININE, CALCIUM, MG, ALT, AST, ALBUMIN, BILITOT, BILIDIR    ASSESSMENT/PLAN:     Sedation Plan: Moderate.  Patient will undergo image guided transjugular liver biopsy.    Darius Salas  Radiology R4

## 2018-10-18 NOTE — PROCEDURES
Radiology Post-Procedure Note    Pre Op Diagnosis: liver dysfunction  Post Op Diagnosis: Same    Procedure: TJ liver biopsy      Procedure performed by: Harley George MD    Written Informed Consent Obtained: Yes  Specimen Removed: YES 5 core specimens  Estimated Blood Loss: Minimal    Findings:   Successful TJ liver biopsy. See dictation for details.    Patient tolerated procedure well.    @SIG@

## 2018-10-18 NOTE — DISCHARGE INSTRUCTIONS
Please call with any questions or concerns.      Monday thru Friday 8:00 am - 4:30 pm    Interventional Radiology   [phone]    After Hours    Ask for the Radiology Intern on call  [phone]

## 2018-10-19 ENCOUNTER — TELEPHONE (OUTPATIENT)
Dept: HEPATOLOGY | Facility: CLINIC | Age: 33
End: 2018-10-19

## 2018-10-19 NOTE — TELEPHONE ENCOUNTER
----- Message from Nancy Lam MD sent at 10/19/2018  2:25 PM CDT -----  Pl inform pt:  There is no increase in pressure in the abdominal veins (like we would see in patients with cirrhosis), therefore I suspect there is no cirrhosis.  However, we will wait to see biopsy report.

## 2018-10-30 ENCOUNTER — TELEPHONE (OUTPATIENT)
Dept: HEPATOLOGY | Facility: CLINIC | Age: 33
End: 2018-10-30

## 2018-10-30 NOTE — TELEPHONE ENCOUNTER
----- Message from Nancy Lam MD sent at 10/28/2018 12:16 PM CDT -----  Pl inform patient:  Liver biopsy is normal. No increased scar, inflammation or fat in the liver.

## 2018-10-30 NOTE — TELEPHONE ENCOUNTER
Patient called and message relayed to the patient from Dr Lam regarding recent TJ Liver Biopsy.  Please inform patient:   Liver biopsy is normal. No increased scar, inflammation or fat in the liver.   Patient glad to get the good news.  Voiced understanding.

## 2018-11-06 ENCOUNTER — OFFICE VISIT (OUTPATIENT)
Dept: HEPATOLOGY | Facility: CLINIC | Age: 33
End: 2018-11-06
Payer: COMMERCIAL

## 2018-11-06 VITALS
OXYGEN SATURATION: 100 % | WEIGHT: 185.19 LBS | SYSTOLIC BLOOD PRESSURE: 154 MMHG | BODY MASS INDEX: 29.07 KG/M2 | DIASTOLIC BLOOD PRESSURE: 102 MMHG | HEIGHT: 67 IN | HEART RATE: 129 BPM

## 2018-11-06 DIAGNOSIS — R18.8 OTHER ASCITES: Primary | ICD-10-CM

## 2018-11-06 PROCEDURE — 99212 OFFICE O/P EST SF 10 MIN: CPT | Mod: S$GLB,,, | Performed by: INTERNAL MEDICINE

## 2018-11-06 PROCEDURE — 99999 PR PBB SHADOW E&M-EST. PATIENT-LVL III: CPT | Mod: PBBFAC,,, | Performed by: INTERNAL MEDICINE

## 2018-11-06 NOTE — PROGRESS NOTES
"[facility] New Patient (Self-Referral) Note    Reason for Visit:  The encounter diagnosis is "cirrhosis with ascites"    PCP:     Interval history:  Patient had presented with ascites in 2017, which was not consistent with portal hypertension. Fibroscan showed minimal fibrosis, platelet count was normal.  Ascites had more lymphocytes than polymorphs.  I had sent her to the gyn mD because of fibroids. Ascites was drained and did not return till June 2018.   Subsequently she was seen by her endocrinologist for hypothyroid, during that visit, she was noted to be tachycardic.  Was admitted to [facility] on 8/1/2018 for supraventricular tachycardia, was taken off liothyronine but continued synthroid.  Also was started on metoprolol.  On a long term basis, has been on amlodipine, birth control pills.       At [facility], Ascites was drained and fluid again was not infected.  She is sent here for a liver biopsy, which was done on 10/18/18.    Liver biopsy Report as follows:   FINAL PATHOLOGIC DIAGNOSIS  LIVER, RANDOM (TRANSJUGULAR BIOPSY):  Unremarkable liver parenchyma  Focal minimal portal-based lymphocytes, nonspecific  No steatosis  No fibrosis, no hepatocyte iron  Iron and trichrome stains with appropriate controls  Diagnosed by: Michelle Pedersen M.D.  (Electronically Signed: 2018-10-27 13:16:04)  Microscopic Examination  Reticulin: No support for nodular regenerative hyperplasia  Performed with appropriate controls.      HPI:  This is a 31 y.o. female here for evaluation of: "cirrhosis"      Patient had upper abdominal distention in the epigastric area about 3 months ago (July 2017), it would be constant, just became bigger after certain foods, like bread, or drank a lot of water.  She also had trouble emptying bowels.  She had a lot of constipation.  An ultrasound was done in the physician's office, which showed "evidence for cirrhosis with ascites".  She did not receive diuretics.  Was not on any particular diet.  "     She also had thyroid function tests and CBC, and was told she had hypothryoidism and anemia.  Was given synthroid 200 mcg daily before breakfast, currently takes it.  And she was sent to Dr. Lange, hematologist, for her anemia.  He gave her two IV iron infusions and oral iron.  Last time she went to him she was told to stop the iron because she had now too much iron.     Currently, feels much better, with more energy, constipation is gone, she can climb up stairs.  But epigastric fullness still persists.        Elevated liver enzymes: No  Abnormal imaging: Yes  Cirrhosis: Yes  Hepatitis C: No  Hepatitis B: No  Fatty liver: No  Encephalopathy: No  Post-hospital discharge: No  Symptoms: abdomen feels big in the epigastric area    Primary hepatic manifestations:  Fatigue:No  Edema:No  Ascites:No  Encephalopathy:No  Abdominal pain:No  GI bleeds: No  Pruritus:No  Weight Changes:No  Changes in Bowel habits: No  Muscle cramps:No    Risk factors for liver disease:  No jaundice  No transfusions  No IVDU  Did not snort cocaine or similar agents  Did not live with anyone with hepatitis B or C  Sexual partner not tested  No hepatotoxic medications  No exposure to industrial toxins  Alcohol: none       ROS:  Constitutional: No fevers, chills, weight changes, fatigue  ENT: No allergies, nosebleeds,   CV: No chest pain  Pulm: No cough, shortness of breath  Ophtho: No vision changes  GI/Liver: see HPI  Derm: No rash, itching  Heme: No swollen glands, bruising  MSK: No joint pains, joint swelling  : No dysuria, hematuria, decrease in urine output  Endo: No hot or cold intolerance  Neuro: No confusion, disorientation, difficulty with sleep, memory, concentration, syncope, seizure  Psych: No anxiety, depression    Medical History:  has no past medical history on file.    Surgical History:  has no past surgical history on file.    Family History: family history is not on file..     Social History:  social    Current Outpatient  "Medications   Medication Sig    amLODIPine (NORVASC) 5 MG tablet Take 5 mg by mouth every evening.    levothyroxine (SYNTHROID) 137 MCG Tab tablet Take 137 mcg by mouth once daily.    metoprolol succinate (TOPROL-XL) 25 MG 24 hr tablet Take 25 mg by mouth 2 (two) times daily.    norethindrone-ethinyl estradiol (JUNEL FE 1/20) 1 mg-20 mcg (21)/75 mg (7) per tablet Take 1 tablet by mouth once daily.     No current facility-administered medications for this visit.        Objective Findings:    Vital Signs:  Vitals:    11/06/18 1031   BP: (!) 154/102   Pulse: (!) 129           Physical Exam:  General Appearance: Well appearing in no acute distress  Head:   Normocephalic, without obvious abnormality  Eyes:    No scleral icterus, EOMI  ENT: Neck supple, Lips, mucosa, and tongue normal; teeth and gums normal  Lungs: CTA bilaterally in anterior and posterior fields, no wheezes, no crackles.  Heart:  Regular rate and rhythm, S1, S2 normal, no murmurs heard  Abdomen: Soft, non tender, non distended with positive bowel sounds in all four quadrants. No hepatosplenomegaly, ascites, or mass  Extremities: 2+ pulses, no clubbing, cyanosis or edema  Skin: No rash  Neurologic: CN II-XII intact      Labs:  No results found for this basename: wbc,  hgb,  hct,  plt,  chol,  trig,  hdl,  ldldirect,  inr,  creatinine,  bun,  bilitot,  alt,  ast,  alkphos,  na,  k,  cl,  co2,  tsh,  psa,  gluf,  hgba1c,  microalbur,  afp       Imaging:   Pelvic ultrasound - enlarged uterus with multiple fibroids  Outside ultrasound: 8/2417: cirrhosis with ascites    Endoscopy:    none    Assessment:      -  Patient has had two episodes of ascites needing paracenteses.  Fluid was not consistent with cirrhosis.  Also, Elastography only showed minimal to no hepatic fibrosis.  Outside u/s reported "liver shows evidence for cirrhosis with ascites".  Liver chemistries completely normal.   Improved symptoms after thyroid replacement done.  I believe " hypothryroid state may have caused her symptoms of fatigue, constipation, possibly gatsric emptying problem.  Doubt she has cirrhosis, as shown by fibroscan below.   Fibroscan today shows: kPa 7.1, 14% IQR/med, F0-F1 (closer to F0)  And  = S0, <11% fat     Liver biopsy on 10/18/18 was completely normal.      Father has autoimmune hepatitis, therefore, will recheck enzymes in 3 months.     -  Hypothyroidism much improved with supplemental synthroid.   If indicates singinificant fibrosis or cirrhosis, will get autoimmune markers, her father hads autoimmune hepatitis.      Plan:  Return PRN.               Nancy Lam MD

## 2018-11-09 ENCOUNTER — HOSPITAL ENCOUNTER (OUTPATIENT)
Facility: HOSPITAL | Age: 33
Discharge: HOME | End: 2018-11-12
Attending: EMERGENCY MEDICINE | Admitting: HOSPITALIST
Payer: COMMERCIAL

## 2018-11-09 DIAGNOSIS — R07.9 CHEST PAIN: ICD-10-CM

## 2018-11-09 DIAGNOSIS — E03.9 HYPOTHYROIDISM, UNSPECIFIED TYPE: ICD-10-CM

## 2018-11-09 DIAGNOSIS — R18.8 ASCITES: ICD-10-CM

## 2018-11-09 DIAGNOSIS — J90 PLEURAL EFFUSION: Primary | ICD-10-CM

## 2018-11-09 PROBLEM — I10 HYPERTENSION: Status: ACTIVE | Noted: 2018-11-09

## 2018-11-09 PROBLEM — D64.9 ANEMIA: Status: ACTIVE | Noted: 2018-11-09

## 2018-11-09 LAB
ALBUMIN SERPL BCP-MCNC: 4 G/DL
ALP SERPL-CCNC: 48 U/L
ALT SERPL W/O P-5'-P-CCNC: 8 U/L
ANION GAP SERPL CALC-SCNC: 11 MMOL/L
AST SERPL-CCNC: 17 U/L
B-HCG UR QL: NEGATIVE
BASOPHILS # BLD AUTO: 0.03 K/UL
BASOPHILS NFR BLD: 0.5 %
BILIRUB SERPL-MCNC: 0.5 MG/DL
BNP SERPL-MCNC: 20 PG/ML
BUN SERPL-MCNC: 3 MG/DL
CALCIUM SERPL-MCNC: 9.7 MG/DL
CANCER AG125 SERPL-ACNC: 25 U/ML
CHLORIDE SERPL-SCNC: 103 MMOL/L
CO2 SERPL-SCNC: 23 MMOL/L
CREAT SERPL-MCNC: 0.8 MG/DL
CTP QC/QA: YES
D DIMER PPP IA.FEU-MCNC: 3.32 MG/L FEU
DIFFERENTIAL METHOD: ABNORMAL
EOSINOPHIL # BLD AUTO: 0.1 K/UL
EOSINOPHIL NFR BLD: 0.9 %
ERYTHROCYTE [DISTWIDTH] IN BLOOD BY AUTOMATED COUNT: 16.3 %
EST. GFR  (AFRICAN AMERICAN): >60 ML/MIN/1.73 M^2
EST. GFR  (NON AFRICAN AMERICAN): >60 ML/MIN/1.73 M^2
FERRITIN SERPL-MCNC: 151 NG/ML
GLUCOSE SERPL-MCNC: 95 MG/DL
HCG INTACT+B SERPL-ACNC: <1.2 MIU/ML
HCT VFR BLD AUTO: 36.7 %
HGB BLD-MCNC: 11 G/DL
IMM GRANULOCYTES # BLD AUTO: 0.01 K/UL
IMM GRANULOCYTES NFR BLD AUTO: 0.2 %
INR PPP: 1
IRON SERPL-MCNC: 19 UG/DL
LDH SERPL L TO P-CCNC: 261 U/L
LYMPHOCYTES # BLD AUTO: 1.6 K/UL
LYMPHOCYTES NFR BLD: 28.3 %
MCH RBC QN AUTO: 22.7 PG
MCHC RBC AUTO-ENTMCNC: 30 G/DL
MCV RBC AUTO: 76 FL
MONOCYTES # BLD AUTO: 0.4 K/UL
MONOCYTES NFR BLD: 7.1 %
NEUTROPHILS # BLD AUTO: 3.6 K/UL
NEUTROPHILS NFR BLD: 63 %
NRBC BLD-RTO: 0 /100 WBC
PLATELET # BLD AUTO: 521 K/UL
PMV BLD AUTO: 9.6 FL
POTASSIUM SERPL-SCNC: 3.4 MMOL/L
PROT SERPL-MCNC: 9 G/DL
PROTHROMBIN TIME: 10.9 SEC
RBC # BLD AUTO: 4.85 M/UL
RHEUMATOID FACT SERPL-ACNC: <10 IU/ML
SATURATED IRON: 4 %
SODIUM SERPL-SCNC: 137 MMOL/L
TOTAL IRON BINDING CAPACITY: 434 UG/DL
TRANSFERRIN SERPL-MCNC: 293 MG/DL
TROPONIN I SERPL DL<=0.01 NG/ML-MCNC: <0.006 NG/ML
TROPONIN I SERPL DL<=0.01 NG/ML-MCNC: <0.006 NG/ML
TSH SERPL DL<=0.005 MIU/L-ACNC: 1.66 UIU/ML
WBC # BLD AUTO: 5.76 K/UL

## 2018-11-09 PROCEDURE — 84443 ASSAY THYROID STIM HORMONE: CPT

## 2018-11-09 PROCEDURE — 25000003 PHARM REV CODE 250: Performed by: HOSPITALIST

## 2018-11-09 PROCEDURE — 83540 ASSAY OF IRON: CPT

## 2018-11-09 PROCEDURE — 99285 EMERGENCY DEPT VISIT HI MDM: CPT | Mod: ,,, | Performed by: EMERGENCY MEDICINE

## 2018-11-09 PROCEDURE — G0378 HOSPITAL OBSERVATION PER HR: HCPCS

## 2018-11-09 PROCEDURE — 85379 FIBRIN DEGRADATION QUANT: CPT

## 2018-11-09 PROCEDURE — 82728 ASSAY OF FERRITIN: CPT

## 2018-11-09 PROCEDURE — 84702 CHORIONIC GONADOTROPIN TEST: CPT

## 2018-11-09 PROCEDURE — 85025 COMPLETE CBC W/AUTO DIFF WBC: CPT

## 2018-11-09 PROCEDURE — 84484 ASSAY OF TROPONIN QUANT: CPT | Mod: 91

## 2018-11-09 PROCEDURE — 25000003 PHARM REV CODE 250: Performed by: STUDENT IN AN ORGANIZED HEALTH CARE EDUCATION/TRAINING PROGRAM

## 2018-11-09 PROCEDURE — 99219 PR INITIAL OBSERVATION CARE,LEVL II: CPT | Mod: ,,, | Performed by: HOSPITALIST

## 2018-11-09 PROCEDURE — 99285 EMERGENCY DEPT VISIT HI MDM: CPT

## 2018-11-09 PROCEDURE — 86038 ANTINUCLEAR ANTIBODIES: CPT

## 2018-11-09 PROCEDURE — 93005 ELECTROCARDIOGRAM TRACING: CPT

## 2018-11-09 PROCEDURE — 80053 COMPREHEN METABOLIC PANEL: CPT

## 2018-11-09 PROCEDURE — 81025 URINE PREGNANCY TEST: CPT | Performed by: EMERGENCY MEDICINE

## 2018-11-09 PROCEDURE — 85610 PROTHROMBIN TIME: CPT

## 2018-11-09 PROCEDURE — 25500020 PHARM REV CODE 255: Performed by: EMERGENCY MEDICINE

## 2018-11-09 PROCEDURE — 83615 LACTATE (LD) (LDH) ENZYME: CPT

## 2018-11-09 PROCEDURE — 93010 ELECTROCARDIOGRAM REPORT: CPT | Mod: ,,, | Performed by: INTERNAL MEDICINE

## 2018-11-09 PROCEDURE — 86304 IMMUNOASSAY TUMOR CA 125: CPT

## 2018-11-09 PROCEDURE — 83880 ASSAY OF NATRIURETIC PEPTIDE: CPT

## 2018-11-09 PROCEDURE — 83520 IMMUNOASSAY QUANT NOS NONAB: CPT | Mod: 59

## 2018-11-09 PROCEDURE — 86431 RHEUMATOID FACTOR QUANT: CPT

## 2018-11-09 RX ORDER — IBUPROFEN 200 MG
16 TABLET ORAL
Status: DISCONTINUED | OUTPATIENT
Start: 2018-11-09 | End: 2018-11-12 | Stop reason: HOSPADM

## 2018-11-09 RX ORDER — METOPROLOL SUCCINATE 25 MG/1
25 TABLET, EXTENDED RELEASE ORAL 2 TIMES DAILY
Status: DISCONTINUED | OUTPATIENT
Start: 2018-11-09 | End: 2018-11-10

## 2018-11-09 RX ORDER — ASPIRIN 325 MG
325 TABLET ORAL
Status: COMPLETED | OUTPATIENT
Start: 2018-11-09 | End: 2018-11-09

## 2018-11-09 RX ORDER — POTASSIUM CHLORIDE 20 MEQ/1
40 TABLET, EXTENDED RELEASE ORAL ONCE
Status: COMPLETED | OUTPATIENT
Start: 2018-11-09 | End: 2018-11-09

## 2018-11-09 RX ORDER — SODIUM CHLORIDE 0.9 % (FLUSH) 0.9 %
5 SYRINGE (ML) INJECTION
Status: DISCONTINUED | OUTPATIENT
Start: 2018-11-09 | End: 2018-11-12 | Stop reason: HOSPADM

## 2018-11-09 RX ORDER — GLUCAGON 1 MG
1 KIT INJECTION
Status: DISCONTINUED | OUTPATIENT
Start: 2018-11-09 | End: 2018-11-12 | Stop reason: HOSPADM

## 2018-11-09 RX ORDER — FERROUS SULFATE 325(65) MG
325 TABLET, DELAYED RELEASE (ENTERIC COATED) ORAL 2 TIMES DAILY
Status: DISCONTINUED | OUTPATIENT
Start: 2018-11-10 | End: 2018-11-12 | Stop reason: HOSPADM

## 2018-11-09 RX ORDER — ONDANSETRON 4 MG/1
4 TABLET, ORALLY DISINTEGRATING ORAL EVERY 12 HOURS PRN
Status: DISCONTINUED | OUTPATIENT
Start: 2018-11-09 | End: 2018-11-12 | Stop reason: HOSPADM

## 2018-11-09 RX ORDER — HEPARIN SODIUM 5000 [USP'U]/ML
5000 INJECTION, SOLUTION INTRAVENOUS; SUBCUTANEOUS EVERY 8 HOURS
Status: DISCONTINUED | OUTPATIENT
Start: 2018-11-10 | End: 2018-11-09

## 2018-11-09 RX ORDER — IBUPROFEN 200 MG
24 TABLET ORAL
Status: DISCONTINUED | OUTPATIENT
Start: 2018-11-09 | End: 2018-11-12 | Stop reason: HOSPADM

## 2018-11-09 RX ORDER — ACETAMINOPHEN 325 MG/1
650 TABLET ORAL EVERY 8 HOURS PRN
Status: DISCONTINUED | OUTPATIENT
Start: 2018-11-09 | End: 2018-11-12 | Stop reason: HOSPADM

## 2018-11-09 RX ORDER — AMLODIPINE BESYLATE 5 MG/1
5 TABLET ORAL NIGHTLY
Status: DISCONTINUED | OUTPATIENT
Start: 2018-11-10 | End: 2018-11-10

## 2018-11-09 RX ORDER — RAMELTEON 8 MG/1
8 TABLET ORAL NIGHTLY PRN
Status: DISCONTINUED | OUTPATIENT
Start: 2018-11-09 | End: 2018-11-12 | Stop reason: HOSPADM

## 2018-11-09 RX ADMIN — IOHEXOL 100 ML: 350 INJECTION, SOLUTION INTRAVENOUS at 04:11

## 2018-11-09 RX ADMIN — METOPROLOL SUCCINATE 25 MG: 25 TABLET, EXTENDED RELEASE ORAL at 08:11

## 2018-11-09 RX ADMIN — ASPIRIN 325 MG ORAL TABLET 325 MG: 325 PILL ORAL at 02:11

## 2018-11-09 RX ADMIN — ALUMINUM HYDROXIDE, MAGNESIUM HYDROXIDE, AND SIMETHICONE 50 ML: 200; 200; 20 SUSPENSION ORAL at 02:11

## 2018-11-09 RX ADMIN — POTASSIUM CHLORIDE 40 MEQ: 1500 TABLET, EXTENDED RELEASE ORAL at 10:11

## 2018-11-09 NOTE — ED PROVIDER NOTES
Encounter Date: 11/9/2018       History     Chief Complaint   Patient presents with    Chest Pain     Since this AM after bending over. Reports tightness to chest and back. Also have liver biopsy this Am     32-year-old female with a history of hypertension, hypothyroidism, as well as self-endorsed history of tachycardia presenting with chest pain. Patient states that about 1 hr ago, while at lunch, she bent forward and on straightening up she developed sharp central chest pain overlying the sternum, with associated shortness of breath as well as nonproductive cough. She has never felt pain like this before.  She denies eating anything for lunch, rather just drinking water and orange juice.  Patient takes OCP use.  Endorses that her grandmother may have had a heart attack when she was younger.  Denies any leg swelling, diaphoresis, radiation of pain, dizziness, arm/jaw pain/numbness/tingling, recent prolonged immobility, history of clots.  No recent illness.          Review of patient's allergies indicates:   Allergen Reactions    Lisinopril Swelling     Swollen Mouth     Past Medical History:   Diagnosis Date    Anemia     Ascites     Cirrhosis     Hypertension     Hypothyroid      Past Surgical History:   Procedure Laterality Date    BIOPSY, LIVER, TRANSJUGULAR APPROACH N/A 10/18/2018    Performed by [facility] Diagnostic Provider at [facility] OR [address] [place] [address]    TRANSJUGULAR BIOPSY OF LIVER N/A 10/18/2018    Procedure: BIOPSY, LIVER, TRANSJUGULAR APPROACH;  Surgeon: [facility] Provider;  Location: [facility] OR [address] [place] [address];  Service: Radiology;  Laterality: N/A;     Family History   Problem Relation Age of Onset    Autoimmune disease Father      Social History     Tobacco Use    Smoking status: Never Smoker    Smokeless tobacco: Never Used   Substance Use Topics    Alcohol use: No    Drug use: No     Review of Systems   Constitutional: Negative for chills and fever.   HENT: Negative for congestion and rhinorrhea.     Respiratory: Positive for cough and shortness of breath.    Cardiovascular: Positive for chest pain. Negative for palpitations.   Gastrointestinal: Negative for nausea and vomiting.   Genitourinary: Negative for dysuria and frequency.   Musculoskeletal: Negative for arthralgias and myalgias.   Skin: Negative for color change and pallor.   Neurological: Negative for dizziness and headaches.   Psychiatric/Behavioral: Negative for agitation and confusion.   All other systems reviewed and are negative.      Physical Exam     Initial Vitals [11/09/18 1312]   BP Pulse Resp Temp SpO2   (!) 140/92 (!) 120 18 97.9 °F (36.6 °C) 99 %      MAP       --         Physical Exam    Nursing note and vitals reviewed.  Constitutional: She appears well-developed. No distress.   HENT:   Head: Normocephalic and atraumatic.   Eyes: Conjunctivae are normal. No scleral icterus.   Neck: Normal range of motion.   Cardiovascular: Regular rhythm, normal heart sounds and intact distal pulses. Exam reveals no gallop and no friction rub.    No murmur heard.  Sinus tachycardia.  Distal radial pulses equal, symmetrically timed.     Pulmonary/Chest: Breath sounds normal. No stridor. No respiratory distress. She has no wheezes. She has no rhonchi. She has no rales.   Abdominal: Soft. She exhibits no distension.   Musculoskeletal: Normal range of motion. She exhibits no edema.   Neurological: She is alert and oriented to person, place, and time.   Skin: Skin is warm. No pallor.   Psychiatric: She has a normal mood and affect. Her behavior is normal.         ED Course   Procedures  Labs Reviewed   CBC W/ AUTO DIFFERENTIAL   COMPREHENSIVE METABOLIC PANEL   TROPONIN I   B-TYPE NATRIURETIC PEPTIDE   D DIMER, QUANTITATIVE        HO-3 MDM:  This is an emergent evaluation of a 32-year-old female, with history of hypertension and tachycardia, presenting with acute onset of atypical type chest pain about 1 hr prior to arrival, associated with cough and  shortness of breath, presenting with vital signs notable for sinus tachycardia to the 110/126, which patient does endorse is her baseline, but otherwise stable vitals and a cardiopulmonary exam that is unremarkable, with intact distal pulses and no neurologic symptoms of the extremities. Differential includes but is not limited to ACS, PE, pneumothorax, pneumonia, viral URI, musculoskeletal pain, GERD/dyspepsia, anxiety. In terms of ACS, patient has risk factor of hypertension and possible family history, so will assess patient with troponin (x2 given timing of pain), BNP, repeat EKG.  Initial EKG shows no ST/TW changes suggestive of ischemia or right heart strain.  In terms of PE, patient does take oral contraceptives and has tachycardia on arrival, unable to meet PERC criteria, so we will assess with a D-dimer.  She has no evidence on exam to suggest pneumothorax, pneumonia, with reassurance that patient is afebrile and hemodynamically stable. Will trial a GI cocktail initially for pain relief as well. ASA provided.    Gokul Freeman M.D.  Emergency Medicine, PGY-3  11/09/2018 2:12 PM    HO-3 Update:  Initial troponin negative.  D-dimer returned positive, for which we will obtain a CT PE study to evaluate for potential PE.  Patient remains hemodynamically stable. Dispo pending, will repeat troponin at 5:00 p.m..  Gokul Freeman M.D.  Emergency Medicine, PGY-3  11/09/2018 3:30 PM    HO-3 Update:  Repeat troponin negative. Of note, CT does not show a PE, but does show a large pleural effusion to the right lower lung base, and after discussion with the patient we will admit her for workup.  Gokul Freeman M.D.  Emergency Medicine, PGY-3  11/09/2018 6:57 PM      Imaging Results    None                               Clinical Impression:   The encounter diagnosis was Chest pain.                             Gokul Gillespie MD  Resident  11/09/18 [id number]

## 2018-11-09 NOTE — PROVIDER PROGRESS NOTES - EMERGENCY DEPT.
Encounter Date: 11/9/2018    ED Physician Progress Notes         EKG - STEMI Decision  Initial Reading: No STEMI present.

## 2018-11-09 NOTE — ED TRIAGE NOTES
Presents to ER with complaint of sudden onset of sharp chest pain that radiated to her back while at work today.  Patient's name and date of birth checked and is correct.    LOC: The patient is awake, alert, and oriented to place, time, situation. Affect is appropriate.  Speech is appropriate and clear.      APPEARANCE: Patient resting comfortably, reporting palpation, light headedness,  in no acute distress.  Patient is clean and well groomed.     SKIN: The skin is warm and dry; color consistent with ethnicity.  Patient has normal skin turgor and moist mucus membranes.  Skin intact; no breakdown or bruising noted.      MUSCULOSKELETAL: Patient moving upper and lower extremities without difficulty.  Denies weakness.      RESPIRATORY: Airway is open and patent. Respirations spontaneous, even, easy, and non-labored.  Patient has a normal effort and rate.  No accessory muscle use noted. Denies cough.  BS clear.     CARDIAC:  No peripheral edema noted. No complaints of chest pain.       ABDOMEN: Soft and non tender to palpation.  No distention noted.      NEUROLOGIC: Eyes open spontaneously.  Behavior appropriate to situation.  Follows commands; facial expression symmetrical.  Purposeful motor response noted; normal sensation in all extremities.

## 2018-11-10 LAB
ALBUMIN FLD-MCNC: 3.2 G/DL
APPEARANCE FLD: NORMAL
BODY FLD TYPE: NORMAL
BODY FLUID COMMENTS: NORMAL
BODY FLUID SOURCE, LDH: NORMAL
COLOR FLD: NORMAL
EOSINOPHIL NFR FLD MANUAL: 1 %
GLUCOSE FLD-MCNC: 89 MG/DL
GRAM STN SPEC: NORMAL
GRAM STN SPEC: NORMAL
LDH FLD L TO P-CCNC: 226 U/L
LYMPHOCYTES NFR FLD MANUAL: 51 %
MESOTHL CELL NFR FLD MANUAL: 4 %
MONOS+MACROS NFR FLD MANUAL: 28 %
NEUTROPHILS NFR FLD MANUAL: 16 %
PROT FLD-MCNC: 5.6 G/DL
SPECIMEN SOURCE: NORMAL
WBC # FLD: 766 /CU MM

## 2018-11-10 PROCEDURE — 99226 PR SUBSEQUENT OBSERVATION CARE,LEVEL III: CPT | Mod: ,,, | Performed by: HOSPITALIST

## 2018-11-10 PROCEDURE — 89051 BODY FLUID CELL COUNT: CPT

## 2018-11-10 PROCEDURE — 25000003 PHARM REV CODE 250: Performed by: HOSPITALIST

## 2018-11-10 PROCEDURE — 88112 CYTOPATH CELL ENHANCE TECH: CPT | Mod: 26,,, | Performed by: PATHOLOGY

## 2018-11-10 PROCEDURE — 87206 SMEAR FLUORESCENT/ACID STAI: CPT

## 2018-11-10 PROCEDURE — 88305 TISSUE EXAM BY PATHOLOGIST: CPT | Performed by: PATHOLOGY

## 2018-11-10 PROCEDURE — 82042 OTHER SOURCE ALBUMIN QUAN EA: CPT

## 2018-11-10 PROCEDURE — 87075 CULTR BACTERIA EXCEPT BLOOD: CPT

## 2018-11-10 PROCEDURE — 83615 LACTATE (LD) (LDH) ENZYME: CPT

## 2018-11-10 PROCEDURE — 84157 ASSAY OF PROTEIN OTHER: CPT

## 2018-11-10 PROCEDURE — 88305 TISSUE EXAM BY PATHOLOGIST: CPT | Mod: 26,,, | Performed by: PATHOLOGY

## 2018-11-10 PROCEDURE — 87015 SPECIMEN INFECT AGNT CONCNTJ: CPT

## 2018-11-10 PROCEDURE — 99226 PR SUBSEQUENT OBSERVATION CARE,LEVEL III: CPT | Mod: ,,, | Performed by: PHYSICIAN ASSISTANT

## 2018-11-10 PROCEDURE — 87070 CULTURE OTHR SPECIMN AEROBIC: CPT

## 2018-11-10 PROCEDURE — 87116 MYCOBACTERIA CULTURE: CPT

## 2018-11-10 PROCEDURE — 87102 FUNGUS ISOLATION CULTURE: CPT

## 2018-11-10 PROCEDURE — 32555 ASPIRATE PLEURA W/ IMAGING: CPT

## 2018-11-10 PROCEDURE — 25000003 PHARM REV CODE 250

## 2018-11-10 PROCEDURE — 32554 ASPIRATE PLEURA W/O IMAGING: CPT | Mod: RT,,, | Performed by: INTERNAL MEDICINE

## 2018-11-10 PROCEDURE — 87205 SMEAR GRAM STAIN: CPT

## 2018-11-10 PROCEDURE — G0378 HOSPITAL OBSERVATION PER HR: HCPCS

## 2018-11-10 PROCEDURE — 88112 CYTOPATH CELL ENHANCE TECH: CPT | Performed by: PATHOLOGY

## 2018-11-10 PROCEDURE — 82945 GLUCOSE OTHER FLUID: CPT

## 2018-11-10 PROCEDURE — 99214 OFFICE O/P EST MOD 30 MIN: CPT | Mod: 25,,, | Performed by: INTERNAL MEDICINE

## 2018-11-10 RX ORDER — GABAPENTIN 100 MG/1
100 CAPSULE ORAL 3 TIMES DAILY
Status: DISCONTINUED | OUTPATIENT
Start: 2018-11-10 | End: 2018-11-12 | Stop reason: HOSPADM

## 2018-11-10 RX ORDER — LIDOCAINE 50 MG/G
1 PATCH TOPICAL
Status: DISCONTINUED | OUTPATIENT
Start: 2018-11-10 | End: 2018-11-12 | Stop reason: HOSPADM

## 2018-11-10 RX ORDER — METOPROLOL SUCCINATE 25 MG/1
25 TABLET, EXTENDED RELEASE ORAL 2 TIMES DAILY
Status: DISCONTINUED | OUTPATIENT
Start: 2018-11-10 | End: 2018-11-12 | Stop reason: HOSPADM

## 2018-11-10 RX ORDER — LIDOCAINE HYDROCHLORIDE 10 MG/ML
5 INJECTION, SOLUTION EPIDURAL; INFILTRATION; INTRACAUDAL; PERINEURAL ONCE
Status: COMPLETED | OUTPATIENT
Start: 2018-11-10 | End: 2018-11-10

## 2018-11-10 RX ORDER — LEVOTHYROXINE SODIUM 137 UG/1
137 TABLET ORAL
Status: DISCONTINUED | OUTPATIENT
Start: 2018-11-11 | End: 2018-11-12 | Stop reason: HOSPADM

## 2018-11-10 RX ORDER — AMLODIPINE BESYLATE 2.5 MG/1
5 TABLET ORAL NIGHTLY
Status: DISCONTINUED | OUTPATIENT
Start: 2018-11-10 | End: 2018-11-12 | Stop reason: HOSPADM

## 2018-11-10 RX ORDER — ACETAMINOPHEN 500 MG
1000 TABLET ORAL 3 TIMES DAILY
Status: DISPENSED | OUTPATIENT
Start: 2018-11-10 | End: 2018-11-12

## 2018-11-10 RX ADMIN — METOPROLOL SUCCINATE 25 MG: 25 TABLET, EXTENDED RELEASE ORAL at 09:11

## 2018-11-10 RX ADMIN — FERROUS SULFATE TAB EC 325 MG (65 MG FE EQUIVALENT) 325 MG: 325 (65 FE) TABLET DELAYED RESPONSE at 09:11

## 2018-11-10 RX ADMIN — FERROUS SULFATE TAB EC 325 MG (65 MG FE EQUIVALENT) 325 MG: 325 (65 FE) TABLET DELAYED RESPONSE at 08:11

## 2018-11-10 RX ADMIN — LIDOCAINE HYDROCHLORIDE 50 MG: 10 INJECTION, SOLUTION EPIDURAL; INFILTRATION; INTRACAUDAL; PERINEURAL at 10:11

## 2018-11-10 RX ADMIN — AMLODIPINE BESYLATE 5 MG: 2.5 TABLET ORAL at 09:11

## 2018-11-10 NOTE — H&P
History and Physical   Hospital Medicine     Patient Name: Divina Ricci  MRN:  [id number]  Hospital Medicine Team: Networked reference to record PCT  Tish Madsen MD  Date of Admission:  11/9/2018     Principal Problem:  Chest pain   Primary Care Physician: Hetal Horan MD      History of Present Illness:     Ms. Divina Ricci is a 32 y.o. female with hx of ascites, no liver disease (liver bx in 10/2018 with no cirrhosis/ fibrosis), hypothyroidism, anemia, and HTN , presented to the ED on 11/9 with 1 day hx of chest pain. Patient noted that pain started on earlier on 11/9 when she was bending over, sharp pain, radiating to her sternum as well. Located in upper back/ thoracic region.  No association with strenuous activity, or heaving lifting, not associated with SOB or dyspnea or abdominal pain or nausea . Has been in her usual state of health prior to this. No associated fevers or chills.     Patient with hx of ascites, and has had paracenteses in 2017 and in 8/2018 (at [facility]). In 8/2018, approx 5L were drained. Prior ascitic fluid studies are notable for SAAG of 0.9/ nonportal HTN causes. Has been seen by [facility] [place] Lam. Patient is s/p TJ liver bx on 10/2018, with pathology results showing no cirrhosis or fibrosis. Family hx is significant for autoimmune hepatitis in her father and hx of liver transplant (for unknown to patient reasons ) in her paternal aunt. Patient is on OCP, has hx of fibroids. No prior person hx of cancers, no exposures to TB.     In the ED, patient was found to be HDS on RA. Cardiac work up negative. Pain is well controlled. CXR showed a large R sided pleural effusion. CTA chest showed no evidence of PE. Admitted to OBS per ED    Review of Systems   Constitutional: Negative for chills, fatigue, fever.   HENT: Negative for sore throat, trouble swallowing.    Eyes: Negative for photophobia, visual disturbance.   Respiratory: Negative for cough,  shortness of breath.    Cardiovascular: Negative for  palpitations, leg swelling. chest pain per HPI  Gastrointestinal: Negative for abdominal pain, constipation, diarrhea, nausea, vomiting.   Endocrine: Negative for cold intolerance, heat intolerance.   Genitourinary: Negative for dysuria, frequency.   Musculoskeletal: Negative for arthralgias, myalgias.   Skin: Negative for rash, wound, erythema   Neurological: Negative for dizziness, syncope, weakness, light-headedness.   Psychiatric/Behavioral: Negative for confusion, hallucinations, anxiety  All other systems reviewed and are negative.      Past Medical History:   Past Medical History:   Diagnosis Date    Anemia     Ascites     Hypertension     Hypothyroid        Past Surgical History:   Past Surgical History:   Procedure Laterality Date    BIOPSY, LIVER, TRANSJUGULAR APPROACH N/A 10/18/2018    Performed by [facility] Diagnostic Provider at [facility] OR [address] [place] [address]    TRANSJUGULAR BIOPSY OF LIVER N/A 10/18/2018    Procedure: BIOPSY, LIVER, TRANSJUGULAR APPROACH;  Surgeon: [facility] Diagnostic Provider;  Location: [facility] OR [address] [place] [address];  Service: Radiology;  Laterality: N/A;       Social History:   Social History     Tobacco Use    Smoking status: Never Smoker    Smokeless tobacco: Never Used   Substance Use Topics    Alcohol use: No    Drug use: No       Family History:   Family History   Problem Relation Age of Onset    Autoimmune disease Father         autoimmune hepatitis    Liver disease Paternal Aunt         liver failure, s/p liver transplant     Breast cancer Paternal Aunt         onset in 50s         Medications: Scheduled Meds:   [START ON 11/10/2018] amLODIPine  5 mg Oral QHS    [START ON 11/10/2018] levothyroxine  137 mcg Oral Before breakfast    metoprolol succinate  25 mg Oral BID     Continuous Infusions:  PRN Meds:.acetaminophen, dextrose 50%, dextrose 50%, glucagon (human recombinant), glucose, glucose, ondansetron, ramelteon, sodium chloride  0.9%    Allergies: Patient is allergic to lisinopril.    Physical Exam:     Vital Signs (Most Recent):  Temp: 98.4 °F (36.9 °C) (11/09/18 1820)  Pulse: 95 (11/09/18 2050)  Resp: 18 (11/09/18 1820)  BP: 131/86 (11/09/18 2050)  SpO2: 99 % (11/09/18 1820) Vital Signs Range (Last 24H):  Temp:  [97.9 °F (36.6 °C)-98.4 °F (36.9 °C)]   Pulse:  []   Resp:  [16-18]   BP: (130-140)/(84-92)   SpO2:  [99 %-100 %]    Body mass index is 28.98 kg/m².     Physical Exam:  Constitutional: Well-developed, well-nourished, non-distressed, not diaphoretic.   HENT: NC/AT, external ears normal, oropharynx clear, MMM w/o exudates.   Eyes: PERRL, EOMI, conjunctiva normal, no discharge b/l, no scleral icterus   Neck: normal ROM, supple,  CV: RRR, no m/r/g, no carotid bruits, +2 peripheral pulses. Pain not reproducible to palpation   Pulmonary/Chest wall: Breathing comfortably w/o distress, absent breath sounds at R lung base, decreased breath sounds at L lung base and otherwise clear   GI: Soft, non-tender, non-distended, (+) BS, (+) BM   Musculoskeletal: Normal ROM, no edema, no atrophy, no tenderness throughout   Neurological: AAO x 4, CN II-XI in tact  Skin: warm, dry, (-) erythema, (-) rash, (-)pallor  Psych: normal mood and affect, normal behavior, thought content and judgement.  Vitals reviewed.    Labs:    Cardiac Enzymes: Ejection Fractions:    Recent Labs     11/09/18  1406 11/09/18  1653   TROPONINI <0.006 <0.006   BNP 20 No results found for: EF     Chemistries:   Recent Labs   Lab 11/09/18  1406      K 3.4*      CO2 23   BUN 3*   CREATININE 0.8   CALCIUM 9.7   PROT 9.0*   BILITOT 0.5   ALKPHOS 48*   ALT 8*   AST 17        WBC:   Recent Labs   Lab 11/09/18  1406   WBC 5.76     Bands:     CBC/Anemia Labs: Coags:    Recent Labs   Lab 11/09/18  1406 11/09/18  1653   WBC 5.76  --    HGB 11.0*  --    HCT 36.7*  --    *  --    MCV 76*  --    RDW 16.3*  --    IRON  --  19*   FERRITIN  --  151    Recent Labs   Lab  11/09/18  1406   INR 1.0        Diagnostic Results:  CTA chest    No definite evidence of pulmonary thromboembolism noting that respiratory motion artifact obscures assessment of segmental and subsegmental arteries.    Large quantity of dependent fluid in the right pleural space with associated compressive atelectasis and resultant leftward mediastinal shift.    Ascites in the partially visualized abdomen.    Assessment and Plan:     Ms. Divina Ricci is a 32 y.o. female with hx of ascites, no liver disease (liver bx in 10/2018 with no cirrhosis/ fibrosis), hypothyroidism, anemia, and HTN , presented to the ED on 11/9 with 1 day hx of chest pain, and found to have a large R sided pleural effusion.     Active Hospital Problems    Diagnosis  POA    *Chest pain [R07.9]  Yes     Priority: 1 - High    Pleural effusion on right [J90]  Yes     Priority: 2     Ascites [R18.8]  Yes     Priority: 3     Hypertension [I10]  Yes    Anemia [D64.9]  Yes    Hypothyroid [E03.9]  Yes      Resolved Hospital Problems   No resolved problems to display.     Chest pain  - likely 2/2 pleural effusion. Cardiac markers negative  - PO pain control   - as below    R pleural effusion  - new onset, stable with no respiratory failure. Likely related to prior hx of ascites, with liver bx being wnl.   - no concern for infectious etiology/ empyema at this time  - may be related to TB vs Meig's syndrome vs vasculitis vs autoimmune, etc  - check , B HCG, LDH, RONNIE, RF. Check immunoglobulin free LT chains given elevated protein gap  - Pulmonary consult for thoracentesis for AM. Will need investigation for TB as well as cytology checked on the fluid studies  - deferring initiation of diuretics at this time  - may likely benefit from Pulmonary clinic referral     Ascites  -Prior ascitic fluid studies are notable for SAAG of 0.9/ nonportal HTN causes. Has been seen by [facility] [place] Lam. Patient is s/p TJ liver bx on 10/2018, with  pathology results showing no cirrhosis or fibrosis.   - deferring initiation of diuretics at this time    HTN  - cont home CBB and BB    Anemia  - iron deficiency  - started on iron supplementation    Hypothyroid  - cont home synthroid, check TSH       Diet:  Low Na  GI PPx:  n/a  DVT PPx:  SCDs  Goals of Care:    Admitted to OBS. Pulm consult in the AM for thoracentesis. Likely d/c on 11/10    Signing Physician:     Tish Madsen MD  Department of Hospital Medicine   [facility]  Pager [phone]  Spectra [phone]  11/9/2018

## 2018-11-10 NOTE — ED NOTES
Attempted to call report to Observation. Attempt unsuccessful, will try again.   
Attempted to call report x2. Gave nurse spectralink number to call back.   
Pt placed on [facility] box # [id number]  
Report given to LORI Díaz.   
Telemetry Verification   Patient placed on Telemetry Box  Verified with War Room  Box # [id number]   Monitor Tech    Rate 95   Rhythm NSR     
21-Aug-2018 23:33

## 2018-11-10 NOTE — PROCEDURES
"Divina Ricci is a 32 y.o. female patient.    Temp: 98.2 °F (36.8 °C) (11/10/18 1602)  Pulse: 91 (11/10/18 160)  Resp: 18 (11/10/18 1602)  BP: (!) 132/90 (11/10/18 1602)  SpO2: 100 % (11/10/18 1602)  Weight: 83.9 kg (185 lb) (11/10/18 0034)  Height: 5' 7" (170.2 cm) (11/10/18 0034)       Thoracentesis  Date/Time: 11/10/2018 4:48 PM  Location procedure was performed: [facility] OBSERVATION  Performed by: Jan Sharpe MD  Authorized by: Jan Sharpe MD   Pre-operative diagnosis: Right pleural effusion  Post-operative diagnosis: Same  Consent Done: Yes  Consent: Written consent obtained.  Risks and benefits: risks, benefits and alternatives were discussed  Consent given by: patient  Patient understanding: patient states understanding of the procedure being performed  Patient consent: the patient's understanding of the procedure matches consent given  Procedure consent: procedure consent matches procedure scheduled  Relevant documents: relevant documents present and verified  Test results: test results available and properly labeled  Site marked: the operative site was marked  Imaging studies: imaging studies available  Required items: required blood products, implants, devices, and special equipment available  Patient identity confirmed: , name and verbally with patient  Time out: Immediately prior to procedure a "time out" was called to verify the correct patient, procedure, equipment, support staff and site/side marked as required.  Procedure purpose: diagnostic and therapeutic  Indications: pleural effusion  Preparation: Patient was prepped and draped in the usual sterile fashion.  Local anesthesia used: yes    Anesthesia:  Local anesthesia used: yes  Local Anesthetic: lidocaine 1% without epinephrine  Anesthetic total: 10 mL  Patient sedated: no  Description of findings: large volume free flowing pleural effusion noted on the right    Preparation: skin prepped with " ChloraPrep  Patient position: sitting  Ultrasound guidance: yes  Location: right posterior  Intercostal space: 6th  Puncture method: over-the-needle catheter  Needle size: 18  Number of attempts: 1  Drainage amount: 1500 ml  Drainage characteristics: bloody  Patient tolerance: Patient tolerated the procedure well with no immediate complications  Chest x-ray performed: yes  Complications: No  Estimated blood loss (mL): 5  Specimens: Yes (wbc count and diff, ldh, protein, albumin, glucose, cytology culture)  Implants: No          Jan Sharpe MD  [employer] [facility] Fellow  Pager: [phone]  Cell: [phone]

## 2018-11-11 LAB
ALBUMIN SERPL BCP-MCNC: 3.6 G/DL
ALP SERPL-CCNC: 43 U/L
ALT SERPL W/O P-5'-P-CCNC: 6 U/L
ANION GAP SERPL CALC-SCNC: 11 MMOL/L
AST SERPL-CCNC: 17 U/L
BASOPHILS # BLD AUTO: 0.03 K/UL
BASOPHILS NFR BLD: 0.7 %
BILIRUB SERPL-MCNC: 0.6 MG/DL
BUN SERPL-MCNC: 5 MG/DL
CALCIUM SERPL-MCNC: 9.3 MG/DL
CCP AB SER IA-ACNC: <0.5 U/ML
CEA SERPL-MCNC: 2.6 NG/ML
CHLORIDE SERPL-SCNC: 104 MMOL/L
CO2 SERPL-SCNC: 21 MMOL/L
CREAT SERPL-MCNC: 0.8 MG/DL
CRP SERPL-MCNC: 20.25 MG/L
DIFFERENTIAL METHOD: ABNORMAL
EOSINOPHIL # BLD AUTO: 0.1 K/UL
EOSINOPHIL NFR BLD: 1.8 %
ERYTHROCYTE [DISTWIDTH] IN BLOOD BY AUTOMATED COUNT: 16.3 %
EST. GFR  (AFRICAN AMERICAN): >60 ML/MIN/1.73 M^2
EST. GFR  (NON AFRICAN AMERICAN): >60 ML/MIN/1.73 M^2
GLUCOSE SERPL-MCNC: 89 MG/DL
HCT VFR BLD AUTO: 32.6 %
HGB BLD-MCNC: 10 G/DL
IMM GRANULOCYTES # BLD AUTO: 0.01 K/UL
IMM GRANULOCYTES NFR BLD AUTO: 0.2 %
LYMPHOCYTES # BLD AUTO: 1.5 K/UL
LYMPHOCYTES NFR BLD: 33 %
MAGNESIUM SERPL-MCNC: 2.1 MG/DL
MCH RBC QN AUTO: 22.7 PG
MCHC RBC AUTO-ENTMCNC: 30.7 G/DL
MCV RBC AUTO: 74 FL
MONOCYTES # BLD AUTO: 0.4 K/UL
MONOCYTES NFR BLD: 9.8 %
NEUTROPHILS # BLD AUTO: 2.4 K/UL
NEUTROPHILS NFR BLD: 54.5 %
NRBC BLD-RTO: 0 /100 WBC
PHOSPHATE SERPL-MCNC: 3.6 MG/DL
PLATELET # BLD AUTO: 441 K/UL
PMV BLD AUTO: 9.7 FL
POTASSIUM SERPL-SCNC: 3.8 MMOL/L
PROT SERPL-MCNC: 8.2 G/DL
RBC # BLD AUTO: 4.4 M/UL
SODIUM SERPL-SCNC: 136 MMOL/L
WBC # BLD AUTO: 4.39 K/UL

## 2018-11-11 PROCEDURE — 99226 PR SUBSEQUENT OBSERVATION CARE,LEVEL III: CPT | Mod: ,,, | Performed by: PHYSICIAN ASSISTANT

## 2018-11-11 PROCEDURE — 82378 CARCINOEMBRYONIC ANTIGEN: CPT | Mod: 91

## 2018-11-11 PROCEDURE — G0378 HOSPITAL OBSERVATION PER HR: HCPCS

## 2018-11-11 PROCEDURE — 86200 CCP ANTIBODY: CPT

## 2018-11-11 PROCEDURE — 83735 ASSAY OF MAGNESIUM: CPT

## 2018-11-11 PROCEDURE — 99213 OFFICE O/P EST LOW 20 MIN: CPT | Mod: ,,, | Performed by: INTERNAL MEDICINE

## 2018-11-11 PROCEDURE — 63600175 PHARM REV CODE 636 W HCPCS: Performed by: PHYSICIAN ASSISTANT

## 2018-11-11 PROCEDURE — 25000003 PHARM REV CODE 250: Performed by: HOSPITALIST

## 2018-11-11 PROCEDURE — 86141 C-REACTIVE PROTEIN HS: CPT

## 2018-11-11 PROCEDURE — 36415 COLL VENOUS BLD VENIPUNCTURE: CPT

## 2018-11-11 PROCEDURE — 25500020 PHARM REV CODE 255: Performed by: HOSPITALIST

## 2018-11-11 PROCEDURE — 85025 COMPLETE CBC W/AUTO DIFF WBC: CPT

## 2018-11-11 PROCEDURE — 84100 ASSAY OF PHOSPHORUS: CPT

## 2018-11-11 PROCEDURE — 25500020 PHARM REV CODE 255: Performed by: STUDENT IN AN ORGANIZED HEALTH CARE EDUCATION/TRAINING PROGRAM

## 2018-11-11 PROCEDURE — 80053 COMPREHEN METABOLIC PANEL: CPT

## 2018-11-11 RX ORDER — FUROSEMIDE 10 MG/ML
20 INJECTION INTRAMUSCULAR; INTRAVENOUS ONCE
Status: COMPLETED | OUTPATIENT
Start: 2018-11-11 | End: 2018-11-11

## 2018-11-11 RX ADMIN — METOPROLOL SUCCINATE 25 MG: 25 TABLET, EXTENDED RELEASE ORAL at 08:11

## 2018-11-11 RX ADMIN — IOHEXOL 15 ML: 350 INJECTION, SOLUTION INTRAVENOUS at 02:11

## 2018-11-11 RX ADMIN — FUROSEMIDE 20 MG: 10 INJECTION, SOLUTION INTRAMUSCULAR; INTRAVENOUS at 09:11

## 2018-11-11 RX ADMIN — LEVOTHYROXINE SODIUM 137 MCG: 137 TABLET ORAL at 06:11

## 2018-11-11 RX ADMIN — FERROUS SULFATE TAB EC 325 MG (65 MG FE EQUIVALENT) 325 MG: 325 (65 FE) TABLET DELAYED RESPONSE at 08:11

## 2018-11-11 RX ADMIN — IOHEXOL 15 ML: 350 INJECTION, SOLUTION INTRAVENOUS at 01:11

## 2018-11-11 RX ADMIN — IOHEXOL 75 ML: 350 INJECTION, SOLUTION INTRAVENOUS at 04:11

## 2018-11-11 RX ADMIN — AMLODIPINE BESYLATE 5 MG: 2.5 TABLET ORAL at 08:11

## 2018-11-11 NOTE — PLAN OF CARE
Problem: Cardiac: ACS (Acute Coronary Syndrome) (Adult)  Goal: Signs and Symptoms of Listed Potential Problems Will be Absent, Minimized or Managed (Cardiac: ACS)  Signs and symptoms of listed potential problems will be absent, minimized or managed by discharge/transition of care (reference Cardiac: ACS (Acute Coronary Syndrome) (Adult) CPG).  Outcome: Ongoing (interventions implemented as appropriate)  Pt denies chest pain at the present. Telemetry monitor in place. Pt instructed to call for any distress

## 2018-11-11 NOTE — ASSESSMENT & PLAN NOTE
"Unknown etiology, case discussed at length with pulmonary team, appreciate assistance -- "differentials include pseudo-meig's syndrome (rare but reported in uterine fibroids, ca-125 not elevated however), endometriosis given color of fluid (most likely given previous fluid with hemosiderin laden m-phage, color or current fluid, patient is also on menstrual cycle presently), autoimmune disease, TB peritonitis, malignancy of unclear origin"  - Gynecology consulted--- imaging ordered   - CT abdomen/pelvis with contrast    - transvaginal US  - Transvaginal US with multiple uterine masses, 2 of which >9cm, ovaries obscured by uterine masses  - Thoracentesis completed successfully per pul 11/10  - new onset, stable with no respiratory failure   - Likely related to prior hx of ascites, with liver bx being wnl  - may be related to TB vs Meig's syndrome vs vasculitis vs autoimmune, etc  - check , B HCG, LDH, RONNEI, RF pending  - stable on room air  "

## 2018-11-11 NOTE — PROGRESS NOTES
[facility] Hospital Medicine  Progress Note    Patient Name: Divina Ricci  MRN: [id number]  Patient Class: OP- Observation   Admission Date: 11/9/2018  Length of Stay: 0 days  Attending Physician: Candida Andrews MD  Primary Care Provider: Hetal Horan MD    [facility] Medicine Team: [facility] HOSP MED E Mejia Juárez PA-C    Subjective:     Principal Problem:Pleural effusion on right    HPI:    Ms. Divina Ricci is a 32 y.o. female with hx of ascites, no liver disease (liver bx in 10/2018 with no cirrhosis/ fibrosis), hypothyroidism, anemia, and HTN , presented to the ED on 11/9 with 1 day hx of chest pain. Patient noted that pain started on earlier on 11/9 when she was bending over, sharp pain, radiating to her sternum as well. Located in upper back/ thoracic region.  No association with strenuous activity, or heaving lifting, not associated with SOB or dyspnea or abdominal pain or nausea . Has been in her usual state of health prior to this. No associated fevers or chills.      Patient with hx of ascites, and has had paracenteses in 2017 and in 8/2018 (at [facility]). In 8/2018, approx 5L were drained. Prior ascitic fluid studies are notable for SAAG of 0.9/ nonportal HTN causes. Has been seen by [facility] Dr Lam. Patient is s/p TJ liver bx on 10/2018, with pathology results showing no cirrhosis or fibrosis. Family hx is significant for autoimmune hepatitis in her father and hx of liver transplant (for unknown to patient reasons ) in her paternal aunt. Patient is on OCP, has hx of fibroids. No prior person hx of cancers, no exposures to TB.      In the ED, patient was found to be HDS on RA. Cardiac work up negative. Pain is well controlled. CXR showed a large R sided pleural effusion. CTA chest showed no evidence of PE. Admitted to OBS per ED    Hospital Course:  Divina Ricci was admitted for chest pain secondary to right pleural effusion. Pulmonology  "completed thoracentesis 11/10 with 1500cc of dark red, cloudy fluid. Per review of chart, fluid was a similar appearance to the ascitic fluid that was aspirated with previous paracentesis. Patient had recent work-up completed with Hepatology however, all liver pathology was ruled out. Gynecology consulted for further evaluation as previous ascitic fluid demonstrated Hemosiderin laden macrophages (indicative of old blood), as can be seen with a hemorrhagic cyst or endometriosis.     No new subjective & objective note has been filed under this hospital service since the last note was generated.    Assessment/Plan:      * Pleural effusion on right    Unknown etiology, case discussed at length with pulmonary team, appreciate assistance -- "differentials include pseudo-meig's syndrome (rare but reported in uterine fibroids, ca-125 not elevated however), endometriosis given color of fluid (most likely given previous fluid with hemosiderin laden m-phage, color or current fluid, patient is also on menstrual cycle presently), autoimmune disease, TB peritonitis, malignancy of unclear origin"  - Gynecology consulted--- imaging ordered   - CT abdomen/pelvis with contrast    - transvaginal US  - Transvaginal US with multiple uterine masses, 2 of which >9cm, ovaries obscured by uterine masses  - Thoracentesis completed successfully per pul 11/10  - new onset, stable with no respiratory failure   - Likely related to prior hx of ascites, with liver bx being wnl  - may be related to TB vs Meig's syndrome vs vasculitis vs autoimmune, etc  - check , B HCG, LDH, RONNIE, RF pending  - stable on room air     Ascites    - IR paracentesis with imaging request placed   - Prior ascitic fluid studies are notable for SAAG of 0.9/ nonportal HTN causes. Has been seen by [facility] Dr Lam. Patient is s/p TJ liver bx on 10/2018, with pathology results showing no cirrhosis or fibrosis.   - will administer IV lasix x1 following imaging    "   Hypertension    -c/w amlodipine     Hypothyroid    - c/w synthroid       VTE Risk Mitigation (From admission, onward)        Ordered     IP VTE HIGH RISK PATIENT  Once      11/09/18 1930              Mejia Juárez PA-C  Department of Hospital Medicine   [facility]

## 2018-11-11 NOTE — PROGRESS NOTES
[facility] Hospital Medicine  Progress Note    Patient Name: Divina Ricci  MRN: [id number]  Patient Class: OP- Observation   Admission Date: 11/9/2018  Length of Stay: 0 days  Attending Physician: Candida Andrews MD  Primary Care Provider: Hetal Horan MD    Hospital Medicine Team: [facility] MED E Mejia Juárez PA-C    Subjective:     Principal Problem:Pleural effusion on right    HPI:    Ms. Divina Ricci is a 32 y.o. female with hx of ascites, no liver disease (liver bx in 10/2018 with no cirrhosis/ fibrosis), hypothyroidism, anemia, and HTN , presented to the ED on 11/9 with 1 day hx of chest pain. Patient noted that pain started on earlier on 11/9 when she was bending over, sharp pain, radiating to her sternum as well. Located in upper back/ thoracic region.  No association with strenuous activity, or heaving lifting, not associated with SOB or dyspnea or abdominal pain or nausea . Has been in her usual state of health prior to this. No associated fevers or chills.      Patient with hx of ascites, and has had paracenteses in 2017 and in 8/2018 (at [facility]). In 8/2018, approx 5L were drained. Prior ascitic fluid studies are notable for SAAG of 0.9/ nonportal HTN causes. Has been seen by [facility] Dr Lam. Patient is s/p TJ liver bx on 10/2018, with pathology results showing no cirrhosis or fibrosis. Family hx is significant for autoimmune hepatitis in her father and hx of liver transplant (for unknown to patient reasons ) in her paternal aunt. Patient is on OCP, has hx of fibroids. No prior person hx of cancers, no exposures to TB.      In the ED, patient was found to be HDS on RA. Cardiac work up negative. Pain is well controlled. CXR showed a large R sided pleural effusion. CTA chest showed no evidence of PE. Admitted to OBS per ED    Hospital Course:  Pulmonology completed thoracentesis today. Work-up in process.    Interval History: Tolerated procedure  well.    Review of Systems   Constitutional: Negative for chills and fever.   HENT: Negative for congestion and dental problem.    Respiratory: Positive for shortness of breath. Negative for cough.    Cardiovascular: Positive for chest pain. Negative for palpitations.   Gastrointestinal: Negative for abdominal pain and constipation.   Endocrine: Negative for polyphagia and polyuria.   Genitourinary: Negative for difficulty urinating and dyspareunia.   Musculoskeletal: Negative for back pain and gait problem.   Neurological: Negative for dizziness and headaches.   Psychiatric/Behavioral: Negative for agitation and behavioral problems.     Objective:     Vital Signs (Most Recent):  Temp: 98 °F (36.7 °C) (11/10/18 1947)  Pulse: 91 (11/10/18 1947)  Resp: 18 (11/10/18 1602)  BP: 130/73 (11/10/18 1947)  SpO2: 100 % (11/10/18 1947) Vital Signs (24h Range):  Temp:  [96.5 °F (35.8 °C)-98.6 °F (37 °C)] 98 °F (36.7 °C)  Pulse:  [] 91  Resp:  [16-18] 18  SpO2:  [97 %-100 %] 100 %  BP: (125-142)/(73-90) 130/73     Weight: 83.9 kg (185 lb)  Body mass index is 28.98 kg/m².    Intake/Output Summary (Last 24 hours) at 11/10/2018 2029  Last data filed at 11/10/2018 1730  Gross per 24 hour   Intake 405 ml   Output 1170 ml   Net -765 ml      Physical Exam   Constitutional: She is oriented to person, place, and time. She appears well-nourished. No distress.   HENT:   Head: Atraumatic.   Eyes: EOM are normal.   Neck: Normal range of motion. No thyromegaly present.   Cardiovascular: Normal rate and regular rhythm.   Pulmonary/Chest: Effort normal. No respiratory distress. She exhibits no tenderness.   Abdominal: Soft. Bowel sounds are normal. She exhibits no distension.   Musculoskeletal: Normal range of motion. She exhibits no edema or tenderness.   Neurological: She is alert and oriented to person, place, and time. No cranial nerve deficit.   Skin: Skin is warm and dry.   Psychiatric: She has a normal mood and affect.   Nursing  note and vitals reviewed.      Significant Labs: All pertinent labs within the past 24 hours have been reviewed.    Significant Imaging: I have reviewed all pertinent imaging results/findings within the past 24 hours.    Assessment/Plan:      * Pleural effusion on right    Thoracentesis completed successfully per pulm  - new onset, stable with no respiratory failure. Likely related to prior hx of ascites, with liver bx being wnl.   - no concern for infectious etiology/ empyema at this time  - may be related to TB vs Meig's syndrome vs vasculitis vs autoimmune, etc  - check , B HCG, LDH, RONNIE, RF. Check immunoglobulin free LT chains given elevated protein gap  - deferring initiation of diuretics at this time     Hypertension    -c/w amlodipine     Hypothyroid    - c/w synthroid     Ascites    -Prior ascitic fluid studies are notable for SAAG of 0.9/ nonportal HTN causes. Has been seen by [facility] Lam. Patient is s/p TJ liver bx on 10/2018, with pathology results showing no cirrhosis or fibrosis.   - deferring initiation of diuretics at this time       VTE Risk Mitigation (From admission, onward)        Ordered     IP VTE HIGH RISK PATIENT  Once      11/09/18 1930              Mejia Juárez PA-C  Department of Hospital Medicine   [facility]

## 2018-11-11 NOTE — NURSING
Called lab x 4 since this am about add ons (C-reactive protein/ESR), lab reports system was down, reports try back at 1530 and again at 1600, will continue to call for lab work to be completed ASAP. Delia Scherer RN

## 2018-11-11 NOTE — ASSESSMENT & PLAN NOTE
- IR paracentesis with imaging request placed   - Prior ascitic fluid studies are notable for SAAG of 0.9/ nonportal HTN causes. Has been seen by [facility] Dr Lam. Patient is s/p TJ liver bx on 10/2018, with pathology results showing no cirrhosis or fibrosis.   - will administer IV lasix x1 following imaging

## 2018-11-11 NOTE — NURSING
Pts home meds verified with Pharmacy per orders for pt to take her own meds. Pt medicated per orders. Pt refused pain control meds due to no complaints of  Pain. Comfort and safety maintained. Call light in reach. Pt instructed to call for any distress.

## 2018-11-11 NOTE — HPI
Ms. Divina Ricci is a 32 y.o. female with hx of ascites, no liver disease (liver bx in 10/2018 with no cirrhosis/ fibrosis), hypothyroidism, anemia, and HTN , presented to the ED on 11/9 with 1 day hx of chest pain. Patient noted that pain started on earlier on 11/9 when she was bending over, sharp pain, radiating to her sternum as well. Located in upper back/ thoracic region.  No association with strenuous activity, or heaving lifting, not associated with SOB or dyspnea or abdominal pain or nausea . Has been in her usual state of health prior to this. No associated fevers or chills.      Patient with hx of ascites, and has had paracenteses in 2017 and in 8/2018 (at [facility]). In 8/2018, approx 5L were drained. Prior ascitic fluid studies are notable for SAAG of 0.9/ nonportal HTN causes. Has been seen by [facility] Dr Lam. Patient is s/p TJ liver bx on 10/2018, with pathology results showing no cirrhosis or fibrosis. Family hx is significant for autoimmune hepatitis in her father and hx of liver transplant (for unknown to patient reasons ) in her paternal aunt. Patient is on OCP, has hx of fibroids. No prior person hx of cancers, no exposures to TB.      In the ED, patient was found to be HDS on RA. Cardiac work up negative. Pain is well controlled. CXR showed a large R sided pleural effusion. CTA chest showed no evidence of PE. Admitted to OBS per ED

## 2018-11-11 NOTE — ASSESSMENT & PLAN NOTE
Thoracentesis completed successfully per pulm  - new onset, stable with no respiratory failure. Likely related to prior hx of ascites, with liver bx being wnl.   - no concern for infectious etiology/ empyema at this time  - may be related to TB vs Meig's syndrome vs vasculitis vs autoimmune, etc  - check , B HCG, LDH, RONNIE, RF. Check immunoglobulin free LT chains given elevated protein gap  - deferring initiation of diuretics at this time

## 2018-11-11 NOTE — CONSULTS
[facility]  Initial Consult Note      Primary Attending:  Candida Andrews MD   Consultant Attending: MARGOTH Leyva MD   Consultant Fellow: Jan Sharpe MD       Chief Complaint/Reason for Consult      Pleural Effusion     History of Present Illness      Ms. Ricci is a 32 year old woman with a medical history significant for treated hypothyroidism, uterine fibroids, chronic microcytic anemia, hypertension and recurrent ascites of unclear origin. Initially noted in 2017, she has undergone multiple paracentesis (here and at [facility]) in addition to a transjugular liver biopsy. No etiology for her ascites has been found. SAAG noted to be less than 1 and her ascitic fluid cytology was negative for malignant cells but positive for hemosiderin laden macrophages. She was followed up in the hepatology clinic since Oct 2017, but has since been discharged from their clinic. They evaluated her for underlying liver disease given a family hx of autoimmune hepatitis in her father. Transjugular biopsy was negative for cirrhosis. RA pressures were normal and hepatic vein and wedge were also normal.     She presented today after feeling a pain in her upper back yesterday which occurred immediately after bending over for a box at work. She reports no dyspnea. Continues to have some pressure in the T1-T2 region. Finds it uncomfortable to lie down. She initially presented to the ED with these symptoms and was found to have a large pleural effusion on the right. She does not endorse fever, chills, weight loss or chronic cough. No travel outside of [place]. No pets. No sick contacts. No personal hx of cancer. She is a life long non smoker and does not abuse etoh or recreational drugs. She works at a  and has no obvious occupational exposures.    Ms. Ricci has no children and has never been pregnant. Admits to wanting children in the future. No known hx of endometriosis per her account. She is  presently on her menstrual cycle.      Past Medical History      Medical:  has a past medical history of Anemia, Ascites, Hypertension, and Hypothyroid.    Surgical:  has a past surgical history that includes BIOPSY, LIVER, TRANSJUGULAR APPROACH (N/A, 10/18/2018).    Family: family history includes Autoimmune disease in her father; Breast cancer in her paternal aunt; Liver disease in her paternal aunt.    Social:  reports that  has never smoked. she has never used smokeless tobacco. She reports that she does not drink alcohol or use drugs.    Allergies and Medications reviewed     Review of Systems      · Other than those symptoms mentioned above, an extensive review of systems was unremarkable.       On Examination     Vital Signs   Temp:  [96.5 °F (35.8 °C)-98.6 °F (37 °C)]   Pulse:  []   Resp:  [16-18]   BP: (125-142)/(75-90)   SpO2:  [97 %-100 %]    Physical Exam   GENERAL: The patient is alert and oriented, in no apparent distress. she is pleasant and conversant in full sentences.    HEENT: Pupils are equally round and briskly reactive to light. Extraocular muscles are grossly intact. Oral mucous membranes are moist without lesions.    NECK: The patient has no noted JVD. No adenopathy is appreciated.    CHEST/LUNGS Breath sounds are significantly decreased in RLL zone field. There is no subcutaneous air appreciated. There is no tenderness to the chest wall. Bedside US reveals large right sided free flowing pleural effusion.     HEART: The patient has a regular rate and rhythm. No murmurs, rubs, or gallops are appreciated. Distal pulses are 2+. Extremities are warm without evidence of cyanosis or poor perfusion.    ABDOMEN: The patients abdomen is completely soft, nontender, and nondistended. Bowel sounds are present. No organomegaly is appreciated. No masses are appreciated. There are no peritoneal signs. Bedside ultrasound reveals moderate ascites.    EXTREMITIES: The patient has no peripheral edema.  There is no focal long bone tenderness or deformity.    SKIN: The patients skin is warm and dry, without rashes or lesions.    PSYCHIATRIC: The patient has normal mental status and has an appropriate affect.    NEUROLOGIC: The patient has equal strength in both upper and lower extremities without focal deficit. There are no gross deficits to the cranial nerves.       All recent labs and imaging studies have been reviewed    Pertinent findings include:    Pelvic MRI    Uterus is anteverted and anteflexed. It measures approximately 18.0 x 13.1 x 9.4 cm.. There are 2 large, predominantly intramural fibroids noted. In the inferior uterine segment, there is a 9.7 x 7.6 x 5.8 cm fibroid. At the fundus, there is a 12.7 x 8.5 x 10.3 cm fibroid. Particularly the fundal fibroid, is multilobulated and may give the appearance of the separate fibroids on ultrasound.    LDH serum    Ca 125 wnl    Reviewed path from 2017 = Hemosiderin laden M phage, reactive mesothelial cells, no malignancy    I have personally and independently interpretted the following tests:    Chest CT: Large right sided effusion with compressive atelectasis and mediastinal shift to the left. No clear parenchymal disease seen. There is also ascites present.     Assessment and Plan / Recommendations     Divina Ricci is a young woman presenting with unilateral massive pleural effusion with ascites.    · Unilateral effusion  · new  · Cryptogenic ascites,   · normal hepatic pressures, normal RA pressure, low SAAG, normal serum albumin   · Uterine fibroids  · No ovarian tumors noted on recent pelvic MRI  · Hypertension  · Controlled on amlodipine  · Hypothyroidism on therapy  · TSH wnl today      - Thoracentesis at bedside today  - 1500 cc of dark red cloudy fluid aspirated  - Sent for cell count, hematocrit, cytology, LDH, protein, albumin, glucose  - Suspect the pleural fluid and the ascites are same process (states it was the same color during her  first para)  - My differential includes an pseudo-meig's syndrome (rare but reported in uterine fibroids, ca-125 not elevated however), endometriosis given color of fluid (most likely given previous fluid with hemosiderin laden m-phage, color or current fluid, patient is also on menstrual cycle presently), autoimmune disease, TB peritonitis, malignancy of unclear origin  - RONNIE panel and RF pending  - AFB sent  - Culture pending  - Cytology pending  - Will review prelim results with patient in the morning and discuss further treatment plan.    This plan was discussed with staff pulmonologist Dr. Leyva    We will continue to follow with you, thank you for the opportunity to be involved in Ms. Ricci's care.    Please call or message directly through EPIC with any additional questions or concerns.    Jan Sharpe MD  [facility] Pulmonary and Critical Care Fellow  Pager: [phone]  Cell: [phone]    11/10/2018  6:18 PM

## 2018-11-11 NOTE — ASSESSMENT & PLAN NOTE
-Prior ascitic fluid studies are notable for SAAG of 0.9/ nonportal HTN causes. Has been seen by [facility] Lam. Patient is s/p TJ liver bx on 10/2018, with pathology results showing no cirrhosis or fibrosis.   - deferring initiation of diuretics at this time

## 2018-11-11 NOTE — PROGRESS NOTES
"[facility]  Consult Follow Up    Primary Attending:  Candida Andrews MD   Consultant Attending: MARGOTH Leyva MD   Consultant Fellow: Jan Sharpe MD     Chief Complaint/Reason for Consult      Pleural effusion     Interval History      Took 1.5 L of bloody fluid from right hemithorax yesterday (not hemothorax based on HCT), reports improved back pain since procedure. No new symptoms. No fevers. No abdominal pain or SOB. Ambulating without difficulty.      Updated Review of Systems      Review of Systems   Constitutional: Negative for chills and fever.   Respiratory: Negative for cough and shortness of breath.    Cardiovascular: Negative for chest pain.   Gastrointestinal: Negative for abdominal pain, nausea and vomiting.   Musculoskeletal: Negative for back pain.   Skin: Negative for rash.          Medications and nursing orders have been reviewed    On Examination     /64   Pulse 95   Temp 97.5 °F (36.4 °C)   Resp 16   Ht 5' 7" (1.702 m)   Wt 83.9 kg (185 lb)   SpO2 98%   Breastfeeding? No   BMI 28.98 kg/m²     Physical Exam   Constitutional: She is oriented to person, place, and time. She appears well-developed and well-nourished. No distress.   HENT:   Head: Normocephalic and atraumatic.   Eyes: Conjunctivae are normal. No scleral icterus.   Neck: Neck supple.   Cardiovascular: Normal rate, regular rhythm and normal heart sounds.   Pulmonary/Chest: Effort normal. She has decreased breath sounds in the right lower field.   Abdominal: Soft.   Musculoskeletal: She exhibits no edema.   Neurological: She is alert and oriented to person, place, and time.   Skin: Skin is warm and dry. No rash noted. No erythema.   Psychiatric: She has a normal mood and affect. Her behavior is normal.   Vitals reviewed.      All recent labs and imaging studies have been reviewed    Pertinent findings include:    Results for ASHLEY CROCKETT (MRN [id number]) as of 11/11/2018 15:55   11/10/2018 15:59   Fluid " Color Red   Fluid Appearance Hazy   Body Fluid Type Pleural Fluid, Right   WBC, Body Fluid 766   Segs, Fluid 16   Lymphs, Fluid 51   Monocytes/Macrophages, Fluid 28   Eos, Fluid 1   Glucose, Fluid 89   LD, Fluid 226   Body Fluid Albumin 3.2   Body Fluid, Protein 5.6   Mesothelial cells, Fluid 4       I have personally and independently interpretted the following tests:    CXR - improved aeration of right lung with residual fluid present.     Assessment and Plan / Recommendations     Divina Ricci is a young woman presenting with unilateral massive pleural effusion with ascites.     · Unilateral effusion  ? new  · Cryptogenic ascites,   ? normal hepatic pressures, normal RA pressure, low SAAG, normal serum albumin   · Uterine fibroids  ? No ovarian tumors noted on recent pelvic MRI  · Hypertension  ? Controlled on amlodipine  · Hypothyroidism on therapy  ? TSH wnl today        - Thoracentesis at bedside yesterday  - 1500 cc of dark red cloudy fluid aspirated  - Exudate by Light's criteria  - Suspect the pleural fluid and the ascites are same process (states it was the same color during her first para)  - My differential includes endometriosis given color of fluid (most likely given previous fluid with hemosiderin laden m-phage, color or current fluid, patient is also on menstrual cycle presently), a pseudo-meig's syndrome (rare but reported in uterine fibroids, ca-125 not elevated however), autoimmune disease, TB peritonitis, malignancy of unclear origin  - RONNIE panel and RF pending  - AFB sent  - Culture pending  - Cytology pending  - Given high suspicion for endometriosis, would encourage discussing case with gynecology today  - Based on their impression and plan, will make further decisions regarding further treatment of pleural effusion (re: possible CT surgery consult for VATS)     This plan was discussed with staff pulmonologist Dr. Leyva     We will continue to follow with you, thank you for the  opportunity to be involved in Ms. Ricci's care.     Please call or message directly through EPIC with any additional questions or concerns.     Jan Sharpe MD  [facility] Pulmonary and Critical Care Fellow  Pager: [phone]  Cell: [phone]

## 2018-11-11 NOTE — NURSING
Pt in bed watching with no distress. Plan for the night and meds discussed with pt. Pt denies pain or shortness of breath. Diet and fluid restriction discussed with pt. Pt instructed on voiding in the measuring hat for accurate measurement of urine. Telemetry monitor in place. Fall precautions maintained. Call light in reach. Reassurance provided

## 2018-11-11 NOTE — PLAN OF CARE
Problem: Patient Care Overview  Goal: Plan of Care Review  Outcome: Ongoing (interventions implemented as appropriate)  Plan of care continued per orders.  Telemetry monitor in place. No complaints of pain or shortness of breath. Fall precautions maintained. Call light in reach. No distress noted.

## 2018-11-11 NOTE — SUBJECTIVE & OBJECTIVE
Interval History: Tolerated procedure well.    Review of Systems   Constitutional: Negative for chills and fever.   HENT: Negative for congestion and dental problem.    Respiratory: Positive for shortness of breath. Negative for cough.    Cardiovascular: Positive for chest pain. Negative for palpitations.   Gastrointestinal: Negative for abdominal pain and constipation.   Endocrine: Negative for polyphagia and polyuria.   Genitourinary: Negative for difficulty urinating and dyspareunia.   Musculoskeletal: Negative for back pain and gait problem.   Neurological: Negative for dizziness and headaches.   Psychiatric/Behavioral: Negative for agitation and behavioral problems.     Objective:     Vital Signs (Most Recent):  Temp: 98 °F (36.7 °C) (11/10/18 1947)  Pulse: 91 (11/10/18 1947)  Resp: 18 (11/10/18 1602)  BP: 130/73 (11/10/18 1947)  SpO2: 100 % (11/10/18 1947) Vital Signs (24h Range):  Temp:  [96.5 °F (35.8 °C)-98.6 °F (37 °C)] 98 °F (36.7 °C)  Pulse:  [] 91  Resp:  [16-18] 18  SpO2:  [97 %-100 %] 100 %  BP: (125-142)/(73-90) 130/73     Weight: 83.9 kg (185 lb)  Body mass index is 28.98 kg/m².    Intake/Output Summary (Last 24 hours) at 11/10/2018 2029  Last data filed at 11/10/2018 1730  Gross per 24 hour   Intake 405 ml   Output 1170 ml   Net -765 ml      Physical Exam   Constitutional: She is oriented to person, place, and time. She appears well-nourished. No distress.   HENT:   Head: Atraumatic.   Eyes: EOM are normal.   Neck: Normal range of motion. No thyromegaly present.   Cardiovascular: Normal rate and regular rhythm.   Pulmonary/Chest: Effort normal. No respiratory distress. She exhibits no tenderness.   Abdominal: Soft. Bowel sounds are normal. She exhibits no distension.   Musculoskeletal: Normal range of motion. She exhibits no edema or tenderness.   Neurological: She is alert and oriented to person, place, and time. No cranial nerve deficit.   Skin: Skin is warm and dry.   Psychiatric: She has  a normal mood and affect.   Nursing note and vitals reviewed.      Significant Labs: All pertinent labs within the past 24 hours have been reviewed.    Significant Imaging: I have reviewed all pertinent imaging results/findings within the past 24 hours.

## 2018-11-11 NOTE — HOSPITAL COURSE
Divina Ricci was admitted for chest pain secondary to right pleural effusion. Pulmonology completed thoracentesis 11/10 with 1500cc of dark red, cloudy fluid. Per review of chart, fluid was a similar appearance to the ascitic fluid that was aspirated with previous paracentesis. Patient had recent work-up completed with Hepatology however, all liver pathology was ruled out. Gynecology consulted for further evaluation as previous ascitic fluid demonstrated Hemosiderin laden macrophages (indicative of old blood), as can be seen with a hemorrhagic cyst or endometriosis. Pulmonary recommending thoracic surgery consult for possible VATS --- recommend outpatient follow-up. Pt discharged in stable condition with referrals and outpatient follow-up.

## 2018-11-12 VITALS
RESPIRATION RATE: 16 BRPM | TEMPERATURE: 98 F | BODY MASS INDEX: 29.03 KG/M2 | SYSTOLIC BLOOD PRESSURE: 136 MMHG | DIASTOLIC BLOOD PRESSURE: 88 MMHG | OXYGEN SATURATION: 100 % | HEIGHT: 67 IN | WEIGHT: 185 LBS | HEART RATE: 111 BPM

## 2018-11-12 LAB
ALBUMIN SERPL BCP-MCNC: 4.2 G/DL
ALP SERPL-CCNC: 46 U/L
ALT SERPL W/O P-5'-P-CCNC: 7 U/L
ANA SER QL IF: NORMAL
ANION GAP SERPL CALC-SCNC: 12 MMOL/L
AST SERPL-CCNC: 15 U/L
BILIRUB SERPL-MCNC: 0.6 MG/DL
BUN SERPL-MCNC: 4 MG/DL
CALCIUM SERPL-MCNC: 10.3 MG/DL
CEA SERPL-MCNC: 2.2 NG/ML
CHLORIDE SERPL-SCNC: 101 MMOL/L
CO2 SERPL-SCNC: 23 MMOL/L
CREAT SERPL-MCNC: 0.8 MG/DL
EST. GFR  (AFRICAN AMERICAN): >60 ML/MIN/1.73 M^2
EST. GFR  (NON AFRICAN AMERICAN): >60 ML/MIN/1.73 M^2
GLUCOSE SERPL-MCNC: 98 MG/DL
KAPPA LC SER QL IA: 1.81 MG/DL
KAPPA LC/LAMBDA SER IA: 1.55
LAMBDA LC SER QL IA: 1.17 MG/DL
POTASSIUM SERPL-SCNC: 3.9 MMOL/L
PROT SERPL-MCNC: 9.2 G/DL
SODIUM SERPL-SCNC: 136 MMOL/L

## 2018-11-12 PROCEDURE — G0378 HOSPITAL OBSERVATION PER HR: HCPCS

## 2018-11-12 PROCEDURE — 25000003 PHARM REV CODE 250: Performed by: HOSPITALIST

## 2018-11-12 PROCEDURE — 86480 TB TEST CELL IMMUN MEASURE: CPT

## 2018-11-12 PROCEDURE — 99226 PR SUBSEQUENT OBSERVATION CARE,LEVEL III: CPT | Mod: ,,, | Performed by: PHYSICIAN ASSISTANT

## 2018-11-12 PROCEDURE — 36415 COLL VENOUS BLD VENIPUNCTURE: CPT

## 2018-11-12 PROCEDURE — 99215 OFFICE O/P EST HI 40 MIN: CPT | Mod: ,,, | Performed by: INTERNAL MEDICINE

## 2018-11-12 PROCEDURE — 80053 COMPREHEN METABOLIC PANEL: CPT

## 2018-11-12 RX ORDER — LIDOCAINE 50 MG/G
1 PATCH TOPICAL DAILY
Qty: 10 PATCH | Refills: 0 | Status: SHIPPED | OUTPATIENT
Start: 2018-11-12 | End: 2018-11-19

## 2018-11-12 RX ORDER — FERROUS SULFATE 325(65) MG
325 TABLET, DELAYED RELEASE (ENTERIC COATED) ORAL 2 TIMES DAILY
Refills: 0 | COMMUNITY
Start: 2018-11-12

## 2018-11-12 RX ORDER — GABAPENTIN 100 MG/1
100 CAPSULE ORAL 3 TIMES DAILY
Qty: 90 CAPSULE | Refills: 11 | Status: SHIPPED | OUTPATIENT
Start: 2018-11-12 | End: 2018-11-19

## 2018-11-12 RX ADMIN — FERROUS SULFATE TAB EC 325 MG (65 MG FE EQUIVALENT) 325 MG: 325 (65 FE) TABLET DELAYED RESPONSE at 09:11

## 2018-11-12 RX ADMIN — LEVOTHYROXINE SODIUM 137 MCG: 137 TABLET ORAL at 06:11

## 2018-11-12 RX ADMIN — METOPROLOL SUCCINATE 25 MG: 25 TABLET, EXTENDED RELEASE ORAL at 09:11

## 2018-11-12 NOTE — PROGRESS NOTES
[facility]  Pulmonology  Progress Note    Patient Name: Divina Ricci  MRN: [id number]  Admission Date: 11/9/2018  Hospital Length of Stay: 0 days  Code Status: Full Code  Attending Provider: Candida Andrews MD  Primary Care Provider: Hetal Horan MD   Principal Problem: Pleural effusion    Subjective:     Interval History:  Pt only c/o back pain. Denies abdominal pain, CP, SOB, N/V/D, fever, chills, appetite change. No pain from thoracentesis on 11/10.                 Review of Systems   Constitutional: Negative for chills and fever.   Respiratory: Negative for cough and shortness of breath.    Cardiovascular: Negative for chest pain.   Gastrointestinal: Negative for abdominal pain, nausea and vomiting.   Musculoskeletal: Negative for back pain.   Skin: Negative for rash.       Medications and nursing orders have been reviewed     On Examination      Objective:     Vital Signs (Most Recent):  Temp: 96.7 °F (35.9 °C) (11/12/18 1134)  Pulse: 102 (11/12/18 1134)  Resp: 18 (11/12/18 1134)  BP: 131/77 (11/12/18 1134)  SpO2: 99 % (11/12/18 1134) Vital Signs (24h Range):  Temp:  [96 °F (35.6 °C)-98.4 °F (36.9 °C)] 96.7 °F (35.9 °C)  Pulse:  [] 102  Resp:  [16-18] 18  SpO2:  [97 %-100 %] 99 %  BP: (124-136)/(77-87) 131/77     Weight: 83.9 kg (185 lb)  Body mass index is 28.98 kg/m².      Intake/Output Summary (Last 24 hours) at 11/12/2018 1234  Last data filed at 11/12/2018 0400  Gross per 24 hour   Intake 700 ml   Output 1900 ml   Net -1200 ml       Physical Exam  Constitutional: She is oriented to person, place, and time. She appears well-developed and well-nourished. No distress.   HENT:   Head: Normocephalic and atraumatic.   Eyes: Conjunctivae are normal. No scleral icterus.   Neck: Neck supple.   Cardiovascular: Normal rate, regular rhythm and normal heart sounds.   Pulmonary/Chest: Effort normal. She has decreased breath sounds in the right lower field.   Abdominal: Soft.    Musculoskeletal: She exhibits no edema.   Neurological: She is alert and oriented to person, place, and time.   Skin: Skin is warm and dry. No rash noted. No erythema.   Psychiatric: She has a normal mood and affect. Her behavior is normal.   Vitals reviewed.    Vents:       Lines/Drains/Airways     Peripheral Intravenous Line                 Peripheral IV - Single Lumen 11/09/18 1526 Right Antecubital 2 days                Significant Labs:    CBC/Anemia Profile:  Recent Labs   Lab 11/11/18  0704   WBC 4.39   HGB 10.0*   HCT 32.6*   *   MCV 74*   RDW 16.3*        Chemistries:  Recent Labs   Lab 11/11/18  0703 11/12/18  0621    136   K 3.8 3.9    101   CO2 21* 23   BUN 5* 4*   CREATININE 0.8 0.8   CALCIUM 9.3 10.3   ALBUMIN 3.6 4.2   PROT 8.2 9.2*   BILITOT 0.6 0.6   ALKPHOS 43* 46*   ALT 6* 7*   AST 17 15   MG 2.1  --    PHOS 3.6  --      Significant Imaging:  CT Abdomen Pelvis With Contrast [[id number]] Resulted: 11/11/18 1655   Order Status: Completed Updated: 11/11/18 1657   Narrative:     EXAMINATION:  CT ABDOMEN PELVIS WITH CONTRAST    CLINICAL HISTORY:  concern for endometriosis; known fibroids; ascites noted on CTA chest;    TECHNIQUE:  The patient was surveyed from the lung bases through the pelvis after the administration of 75 cc Omni 350 IV contrast as well as oral contrast. The data was reconstructed for coronal, sagittal, and axial images.    COMPARISON:  Ultrasound 11/11/2018; CT 10/23/2017, MRI 11/15/2017.    FINDINGS:  There is a large right-sided pleural effusion with associated compressive atelectasis.    The visualized portions of the heart appear normal. No pericardial effusion.    The liver is normal in size and attenuation.  Heterogeneous hypodensity in the anterior aspect of the left hepatic lobe measuring approximately 2.2 cm, nonspecific.  The gallbladder is unremarkable.  No intrahepatic or extrahepatic biliary ductal dilatation is identified. The spleen is  unremarkable.    The pancreas and adrenal glands are unremarkable.    The kidneys are normal in size and location. They concentrate and excrete contrast appropriately.  No hydronephrosis is seen.  The ureters appear normal in course and caliber. . Two large, heterogeneous fibroids are identified arising from the uterine fundus measuring 11.0 cm on the right and 9.3 cm on the left, similar to prior CT 10/23/2017 and better visualized on prior MRI 11/15/2017.    No evidence of bowel obstruction.    Small volume ascites throughout the abdomen and pelvis.  No intraperitoneal free air is identified.  There is no evidence of lymph node enlargement in the abdomen or pelvis.  The abdominal aorta is normal in course and caliber without significant atherosclerotic calcifications.    The osseous structures demonstrate no significant abnormality.    There is a small fluid containing umbilical hernia.   Impression:       1. Small volume ascites.  2. Large right pleural effusion.  3. Two large uterine fibroids, stable when compared to multiple prior studies.  4. Heterogeneous hypodensity within the anterior aspect of the left hepatic lobe, indeterminate.  5. Additional findings as above.    Electronically signed by resident: Marquis Alvarez  Date: 11/11/2018  Time: 16:21    Electronically signed by: Yoni Aquino DO  Date: 11/11/2018  Time: 16:55       Assessment/Plan:     Divina Ricci is a young woman presenting with unilateral massive pleural effusion with ascites.     · Unilateral effusion  ? new  · Cryptogenic ascites,   ? normal hepatic pressures, normal RA pressure, SAAG<1, normal serum albumin   · Uterine fibroids  ? No ovarian tumors noted on recent pelvic MRI  · Hypertension  ? Controlled on amlodipine  · Hypothyroidism on therapy  ? TSH wnl     * Pleural effusion    - Thoracentesis at bedside on 11/12  - 1500 cc of dark red cloudy fluid aspirated  - Exudate by Light's criteria  - Pleural fluid found to be of  similar character to the ascitic fluid (states it was the same color during her first para)  - Differential includes endometriosis given color of fluid (most likely given previous fluid with hemosiderin laden m-phage, color or current fluid, patient is also on menstrual cycle presently), a pseudo-meig's syndrome (rare but reported in uterine fibroids, ca-125 not elevated however), autoimmune disease, TB peritonitis, malignancy of unclear origin. It is common for ascites to seep through fenestrations in the diaphragm and enter the pleural space- suspect this is the etiology.   - RONNIE panel and RF pending  - AFB sent  - Culture pending  - Cytology pending  - Given high suspicion for endometriosis, would encourage discussing case with gynecology for possible laparoscopic evaluation.   - Recommend thoracic surgery consult for their opinion. In cases of catamenial hemothorax, VATS is recommended to evaluate for thoracic endometriosis. However, this presentation suggests endometriosis to be present in peritoneum.       Ascites    - Determined to not be from liver disease. No evidence of cirrhosis on prior transjugular liver biopsy.  - No evidence of intraabdominal cancer on imaging.   - Prior paracenteses have revealed bloody fluid.  - Suspect blood ascites could be from endometriosis.  - Given high suspicion for endometriosis, would encourage discussing case with gynecology for possible laparoscopic evaluation.          We will follow peripherally.       Joby Ren MD  Pulmonology  [facility]    I saw & examined the patient. I personally reviewed all pertinent data in Epic. I discussed the patient and management with the resident. I reviewed the residents note and agree with the documented findings and plan of care. Additionally:    32 yof with waxing & waning ascites over the past year. Has undergone paracentesis twice in the past which revealed bloody fluid. SAAG & liver biopsy revealed  that portal hypertension/liver disease IS NOT the cause. There is no evidence of malignancy on abdominal imaging.    The patient now presents with a unilateral pleural effusion on the right side. It is exudative & bloody in appearance, just like the ascites. There is ascites present on imaging once again.    It is a common phenomenon in patients with ascites to have transfer of fluid from the peritoneum into the right pleural space via anatomical fenestrations in the diaphragm (there is positive pressure in the peritoneum & negative pressure in the pleural space). In patients with cirrhosis as the cause of their ascites, we call this hepatic hydrothorax. I suspect this is the cause of the patient's bloody pleural effusion.    There is no evidence of lung disease on the CT, & VATS would not be the next step in evaluation at this time. The only role for VATS would be if the workup of the abdomen is unrevealing & the exudative pleural effusion becomes a recurrent problem. However, I think it is reasonable to consult thoracic surgery for their input on this interesting case.    The patient should continue to undergo workup of her bloody ascites. There are case reports of endometriosis causing large, bloody ascites. Dr Barnett has also suggested that the fibroids might cause bloody ascites.  I think the next step in workup would be laparoscopic evaluation with or without management of fibroids. Hormone therapy to suppress menses could also be considered.    I offered the patient reassurance that I do not see any signs to suggest that this is an infectious or malignant process.    Edgardo Waters MD  [facility]

## 2018-11-12 NOTE — PLAN OF CARE
Problem: Patient Care Overview  Goal: Plan of Care Review  Outcome: Ongoing (interventions implemented as appropriate)  Patient AAOX4, safety precautions taken pt has no c/o pain/discomfort at this time. Will cont.to monitor.

## 2018-11-12 NOTE — CONSULTS
[facility]  General Surgery  History & Physical    Patient Name: Divina Ricci  MRN: [id number]  Admission Date: 11/9/2018  Attending Physician: Candida Andrews MD   Primary Care Provider: Hetal Horan MD    Patient information was obtained from patient, past medical records and ER records.     Subjective:     Chief Complaint/Reason for Admission: Back pain    History of Present Illness:  Patient is a 32 y.o. female with a past medical history significant for HTN, tachycardia, hypothyroidism and uterine fibroids with asictes (2 paracenteses in the past) who presented to the ED after bending over at work and experiencing chest heaviness and back pain. She was found to have a large right pleural effusion which was drained the next day. 1.5L of bloody fluid was removed from the chest. She is currently completely asymptomatic and has felt better since her tap.     No current facility-administered medications on file prior to encounter.      Current Outpatient Medications on File Prior to Encounter   Medication Sig    amLODIPine (NORVASC) 5 MG tablet Take 5 mg by mouth every evening.    levothyroxine (SYNTHROID) 137 MCG Tab tablet Take 137 mcg by mouth once daily.    metoprolol succinate (TOPROL-XL) 25 MG 24 hr tablet Take 25 mg by mouth 2 (two) times daily.    norethindrone-ethinyl estradiol (JUNEL FE 1/20) 1 mg-20 mcg (21)/75 mg (7) per tablet Take 1 tablet by mouth once daily.       Review of patient's allergies indicates:   Allergen Reactions    Lisinopril Swelling     Swollen Mouth    Pneumococcal 23-brea ps vaccine        Past Medical History:   Diagnosis Date    Anemia     Ascites     Hypertension     Hypothyroid      Past Surgical History:   Procedure Laterality Date    BIOPSY, LIVER, TRANSJUGULAR APPROACH N/A 10/18/2018    Performed by [facility] at [facility] OR [address] [place] [address]    TRANSJUGULAR BIOPSY OF LIVER N/A 10/18/2018    Procedure: BIOPSY, LIVER, TRANSJUGULAR  APPROACH;  Surgeon: [facility];  Location: [facility] OR [address] [place] [address];  Service: Radiology;  Laterality: N/A;     Family History     Problem Relation (Age of Onset)    Autoimmune disease Father    Breast cancer Paternal Aunt    Liver disease Paternal Aunt        Tobacco Use    Smoking status: Never Smoker    Smokeless tobacco: Never Used   Substance and Sexual Activity    Alcohol use: No    Drug use: No    Sexual activity: No     Review of Systems   Constitutional: Negative for activity change, appetite change, fatigue and fever.   Respiratory: Negative for cough and shortness of breath.    Cardiovascular: Negative for chest pain and palpitations.   Gastrointestinal: Negative for abdominal pain, diarrhea and vomiting.   Endocrine: Negative for cold intolerance and heat intolerance.   Musculoskeletal: Negative for arthralgias and myalgias.     Objective:     Vital Signs (Most Recent):  Temp: 97.7 °F (36.5 °C) (11/12/18 1301)  Pulse: 98 (11/12/18 1301)  Resp: 16 (11/12/18 1301)  BP: 133/81 (11/12/18 1301)  SpO2: 99 % (11/12/18 1301) Vital Signs (24h Range):  Temp:  [96 °F (35.6 °C)-98.4 °F (36.9 °C)] 97.7 °F (36.5 °C)  Pulse:  [] 98  Resp:  [16-18] 16  SpO2:  [97 %-100 %] 99 %  BP: (124-136)/(77-87) 133/81     Weight: 83.9 kg (185 lb)  Body mass index is 28.98 kg/m².    Physical Exam   Constitutional: She is oriented to person, place, and time. She appears well-developed and well-nourished. No distress.   Cardiovascular: Normal rate and regular rhythm.   Pulmonary/Chest: Effort normal. No respiratory distress.   Abdominal: Soft. She exhibits no distension. There is no tenderness.   Neurological: She is alert and oriented to person, place, and time.   Skin: Skin is warm and dry.       Significant Labs:  CBC:   Recent Labs   Lab 11/11/18  0704   WBC 4.39   RBC 4.40   HGB 10.0*   HCT 32.6*   *   MCV 74*   MCH 22.7*   MCHC 30.7*     CMP:   Recent Labs   Lab 11/12/18  0621   GLU 98   CALCIUM 10.3    ALBUMIN 4.2   PROT 9.2*      K 3.9   CO2 23      BUN 4*   CREATININE 0.8   ALKPHOS 46*   ALT 7*   AST 15   BILITOT 0.6       Significant Diagnostics:  CT:  Marquis Alvarez MD 11/11/2018       Narrative     EXAMINATION:  CT ABDOMEN PELVIS WITH CONTRAST    CLINICAL HISTORY:  concern for endometriosis; known fibroids; ascites noted on CTA chest;    TECHNIQUE:  The patient was surveyed from the lung bases through the pelvis after the administration of 75 cc Omni 350 IV contrast as well as oral contrast. The data was reconstructed for coronal, sagittal, and axial images.    COMPARISON:  Ultrasound 11/11/2018; CT 10/23/2017, MRI 11/15/2017.    FINDINGS:  There is a large right-sided pleural effusion with associated compressive atelectasis.    The visualized portions of the heart appear normal. No pericardial effusion.    The liver is normal in size and attenuation.  Heterogeneous hypodensity in the anterior aspect of the left hepatic lobe measuring approximately 2.2 cm, nonspecific.  The gallbladder is unremarkable.  No intrahepatic or extrahepatic biliary ductal dilatation is identified. The spleen is unremarkable.    The pancreas and adrenal glands are unremarkable.    The kidneys are normal in size and location. They concentrate and excrete contrast appropriately.  No hydronephrosis is seen.  The ureters appear normal in course and caliber. . Two large, heterogeneous fibroids are identified arising from the uterine fundus measuring 11.0 cm on the right and 9.3 cm on the left, similar to prior CT 10/23/2017 and better visualized on prior MRI 11/15/2017.    No evidence of bowel obstruction.    Small volume ascites throughout the abdomen and pelvis.  No intraperitoneal free air is identified.  There is no evidence of lymph node enlargement in the abdomen or pelvis.  The abdominal aorta is normal in course and caliber without significant atherosclerotic calcifications.    The osseous structures  demonstrate no significant abnormality.    There is a small fluid containing umbilical hernia.      Impression       1. Small volume ascites.  2. Large right pleural effusion.  3. Two large uterine fibroids, stable when compared to multiple prior studies.  4. Heterogeneous hypodensity within the anterior aspect of the left hepatic lobe, indeterminate.  5. Additional findings as above.    Electronically signed by resident: Marquis lAvarez  Date: 11/11/2018  Time: 16:21    Electronically signed by: Yoni Aquino DO  Date: 11/11/2018  Time: 16:55         Assessment/Plan:     Active Diagnoses:    Diagnosis Date Noted POA    PRINCIPAL PROBLEM:  Pleural effusion [J90] 11/09/2018 Yes    Chest pain [R07.9] 11/09/2018 Yes    Hypertension [I10] 11/09/2018 Yes    Anemia [D64.9] 11/09/2018 Yes    Ascites [R18.8] 10/09/2018 Yes    Hypothyroid [E03.9] 10/09/2018 Yes      Problems Resolved During this Admission:     VTE Risk Mitigation (From admission, onward)        Ordered     IP VTE HIGH RISK PATIENT  Once      11/09/18 1930      31 yo female with recurrent ascites and a right pleural effusion. Thoracic surgery consulted for assistance.    Plan:  The etiology of the patient's right pleural effusion is very likely due to whatever is the cause of her ascites. Recommend further work up of abdominal source.  As she is completely asymptomatic right now, will hold off on further intervention of her right pleural effusion. Ok for discharge from a thoracic surgery standpoint. Thank you for the consult.    Mary Ann Padron MD  General Surgery  [facility]

## 2018-11-12 NOTE — SUBJECTIVE & OBJECTIVE
Interval History:  Pt only c/o back pain. Denies abdominal pain, CP, SOB, N/V/D, fever, chills, appetite change. No pain from thoracentesis on 11/10.     Chief Complaint/Reason for Consult   Pleural effusion               Review of Systems   Constitutional: Negative for chills and fever.   Respiratory: Negative for cough and shortness of breath.    Cardiovascular: Negative for chest pain.   Gastrointestinal: Negative for abdominal pain, nausea and vomiting.   Musculoskeletal: Negative for back pain.   Skin: Negative for rash.       Medications and nursing orders have been reviewed     On Examination      Objective:     Vital Signs (Most Recent):  Temp: 96.7 °F (35.9 °C) (11/12/18 1134)  Pulse: 102 (11/12/18 1134)  Resp: 18 (11/12/18 1134)  BP: 131/77 (11/12/18 1134)  SpO2: 99 % (11/12/18 1134) Vital Signs (24h Range):  Temp:  [96 °F (35.6 °C)-98.4 °F (36.9 °C)] 96.7 °F (35.9 °C)  Pulse:  [] 102  Resp:  [16-18] 18  SpO2:  [97 %-100 %] 99 %  BP: (124-136)/(77-87) 131/77     Weight: 83.9 kg (185 lb)  Body mass index is 28.98 kg/m².      Intake/Output Summary (Last 24 hours) at 11/12/2018 1234  Last data filed at 11/12/2018 0400  Gross per 24 hour   Intake 700 ml   Output 1900 ml   Net -1200 ml       Physical Exam  Constitutional: She is oriented to person, place, and time. She appears well-developed and well-nourished. No distress.   HENT:   Head: Normocephalic and atraumatic.   Eyes: Conjunctivae are normal. No scleral icterus.   Neck: Neck supple.   Cardiovascular: Normal rate, regular rhythm and normal heart sounds.   Pulmonary/Chest: Effort normal. She has decreased breath sounds in the right lower field.   Abdominal: Soft.   Musculoskeletal: She exhibits no edema.   Neurological: She is alert and oriented to person, place, and time.   Skin: Skin is warm and dry. No rash noted. No erythema.   Psychiatric: She has a normal mood and affect. Her behavior is normal.   Vitals reviewed.    Vents:        Lines/Drains/Airways     Peripheral Intravenous Line                 Peripheral IV - Single Lumen 11/09/18 1526 Right Antecubital 2 days                Significant Labs:    CBC/Anemia Profile:  Recent Labs   Lab 11/11/18  0704   WBC 4.39   HGB 10.0*   HCT 32.6*   *   MCV 74*   RDW 16.3*        Chemistries:  Recent Labs   Lab 11/11/18  0703 11/12/18  0621    136   K 3.8 3.9    101   CO2 21* 23   BUN 5* 4*   CREATININE 0.8 0.8   CALCIUM 9.3 10.3   ALBUMIN 3.6 4.2   PROT 8.2 9.2*   BILITOT 0.6 0.6   ALKPHOS 43* 46*   ALT 6* 7*   AST 17 15   MG 2.1  --    PHOS 3.6  --      Significant Imaging:  CT Abdomen Pelvis With Contrast [[id number]] Resulted: 11/11/18 1655   Order Status: Completed Updated: 11/11/18 1657   Narrative:     EXAMINATION:  CT ABDOMEN PELVIS WITH CONTRAST    CLINICAL HISTORY:  concern for endometriosis; known fibroids; ascites noted on CTA chest;    TECHNIQUE:  The patient was surveyed from the lung bases through the pelvis after the administration of 75 cc Omni 350 IV contrast as well as oral contrast. The data was reconstructed for coronal, sagittal, and axial images.    COMPARISON:  Ultrasound 11/11/2018; CT 10/23/2017, MRI 11/15/2017.    FINDINGS:  There is a large right-sided pleural effusion with associated compressive atelectasis.    The visualized portions of the heart appear normal. No pericardial effusion.    The liver is normal in size and attenuation.  Heterogeneous hypodensity in the anterior aspect of the left hepatic lobe measuring approximately 2.2 cm, nonspecific.  The gallbladder is unremarkable.  No intrahepatic or extrahepatic biliary ductal dilatation is identified. The spleen is unremarkable.    The pancreas and adrenal glands are unremarkable.    The kidneys are normal in size and location. They concentrate and excrete contrast appropriately.  No hydronephrosis is seen.  The ureters appear normal in course and caliber. . Two large, heterogeneous fibroids are  identified arising from the uterine fundus measuring 11.0 cm on the right and 9.3 cm on the left, similar to prior CT 10/23/2017 and better visualized on prior MRI 11/15/2017.    No evidence of bowel obstruction.    Small volume ascites throughout the abdomen and pelvis.  No intraperitoneal free air is identified.  There is no evidence of lymph node enlargement in the abdomen or pelvis.  The abdominal aorta is normal in course and caliber without significant atherosclerotic calcifications.    The osseous structures demonstrate no significant abnormality.    There is a small fluid containing umbilical hernia.   Impression:       1. Small volume ascites.  2. Large right pleural effusion.  3. Two large uterine fibroids, stable when compared to multiple prior studies.  4. Heterogeneous hypodensity within the anterior aspect of the left hepatic lobe, indeterminate.  5. Additional findings as above.    Electronically signed by resident: Marquis Alvarez  Date: 11/11/2018  Time: 16:21    Electronically signed by: Yoni Aquino DO  Date: 11/11/2018  Time: 16:55       Assessment and Plan:  Divina Ricci is a young woman presenting with unilateral massive pleural effusion with ascites.     · Unilateral effusion  ? new  · Cryptogenic ascites,   ? normal hepatic pressures, normal RA pressure, SAAG<1, normal serum albumin   · Uterine fibroids  ? No ovarian tumors noted on recent pelvic MRI  · Hypertension  ? Controlled on amlodipine  · Hypothyroidism on therapy  ? TSH wnl

## 2018-11-12 NOTE — NURSING
Pt in bed watching TV with mother at bedside. Plan for the night and meds discussed with pt. Pt denies chest or back pain. Dressing to the Right flank area clean dry and intact, no drainage/ swelling or pain to the site. Pt instructed on diet and fluid restriction. Fall precautions maintained. Call light in reach.

## 2018-11-12 NOTE — ASSESSMENT & PLAN NOTE
- Determined to not be from liver disease. No evidence of cirrhosis on prior transjugular liver biopsy.  - No evidence of intraabdominal cancer on imaging.   - Prior paracenteses have revealed bloody fluid.  - Suspect blood ascites could be from endometriosis.  - Given high suspicion for endometriosis, would encourage discussing case with gynecology for possible laparoscopic evaluation.

## 2018-11-12 NOTE — ASSESSMENT & PLAN NOTE
Small volume on US, not sufficient for paracentesis  - IR paracentesis with imaging request placed   - Prior ascitic fluid studies are notable for SAAG of 0.9/ nonportal HTN causes. Has been seen by [facility] Dr Lam. Patient is s/p TJ liver bx on 10/2018, with pathology results showing no cirrhosis or fibrosis.   - will administer IV lasix x1 following imaging

## 2018-11-12 NOTE — PLAN OF CARE
Problem: Patient Care Overview  Goal: Plan of Care Review  Outcome: Ongoing (interventions implemented as appropriate)  Patient AAOX4 safety and infection precautions taken. Pt has no c/o pain/discomfort at this time.

## 2018-11-12 NOTE — PLAN OF CARE
Problem: Patient Care Overview  Goal: Plan of Care Review  Outcome: Ongoing (interventions implemented as appropriate)  Plan of care continued per orders. Pt denies chest pain or back pain. Pt reminded of strict I/Os. Mother at bedside. Comfort and safety maintained. Call light in reach

## 2018-11-12 NOTE — SUBJECTIVE & OBJECTIVE
Interval History: Pt resting in bed, no distress noted. Aware of plan of care, agreeable. Pain improved with PO regimen. US completed 11/12--- small volume ascites, not enough for paracentesis.     Review of Systems   Constitutional: Negative for chills and fever.   HENT: Negative for congestion and dental problem.    Respiratory: Negative for cough and shortness of breath.    Cardiovascular: Negative for chest pain and palpitations.   Gastrointestinal: Negative for abdominal pain and constipation.   Endocrine: Negative for polyphagia and polyuria.   Genitourinary: Negative for difficulty urinating and dyspareunia.   Musculoskeletal: Negative for back pain and gait problem.   Neurological: Negative for dizziness and headaches.   Psychiatric/Behavioral: Negative for agitation and behavioral problems.     Objective:     Vital Signs (Most Recent):  Temp: 97.7 °F (36.5 °C) (11/12/18 1301)  Pulse: 98 (11/12/18 1301)  Resp: 16 (11/12/18 1301)  BP: 133/81 (11/12/18 1301)  SpO2: 99 % (11/12/18 1301) Vital Signs (24h Range):  Temp:  [96 °F (35.6 °C)-98.4 °F (36.9 °C)] 97.7 °F (36.5 °C)  Pulse:  [] 98  Resp:  [16-18] 16  SpO2:  [97 %-100 %] 99 %  BP: (124-136)/(77-87) 133/81     Weight: 83.9 kg (185 lb)  Body mass index is 28.98 kg/m².    Intake/Output Summary (Last 24 hours) at 11/12/2018 1311  Last data filed at 11/12/2018 0400  Gross per 24 hour   Intake 700 ml   Output 1900 ml   Net -1200 ml      Physical Exam   Constitutional: She is oriented to person, place, and time. She appears well-nourished. No distress.   HENT:   Head: Atraumatic.   Eyes: EOM are normal.   Neck: Normal range of motion. No thyromegaly present.   Cardiovascular: Normal rate and regular rhythm.   Pulmonary/Chest: Effort normal. No respiratory distress. She exhibits no tenderness.   Abdominal: Soft. Bowel sounds are normal. She exhibits no distension.   Musculoskeletal: Normal range of motion. She exhibits no edema or tenderness.   Neurological:  She is alert and oriented to person, place, and time. No cranial nerve deficit.   Skin: Skin is warm and dry.   Psychiatric: She has a normal mood and affect.   Nursing note and vitals reviewed.      Significant Labs: All pertinent labs within the past 24 hours have been reviewed.    Significant Imaging: I have reviewed all pertinent imaging results/findings within the past 24 hours.

## 2018-11-12 NOTE — NURSING
Patient discharged to home. IV removed,discharge instructions given. Patient has taken all personal belongings. Patient verbalized his understanding. Patient escorted to her car by family.

## 2018-11-12 NOTE — NURSING
Pt explained of the order for Lasix IVP for one dose. Pt reminded to void in the measuring hat for accurate measurement of urine. Pt complains of room not having a shower. Pt given warm wipes to clean self along with toothbrush/toothpaste. Comfort and safety maintained. Call light in reach

## 2018-11-12 NOTE — PROGRESS NOTES
Consult Note  Gynecology    Consult Requested By: SUE RIDDLE  Reason for Consult: recurrent ascites    SUBJECTIVE:     History of Present Illness:  Patient is a 32 y.o. G0 with h/o HTN and hypothyroidism currently being evaluated for pleural effusion. GYN services requested for evaluation for possible endometriosis or GYN source of ascites.    Patient initially reported to the ED complaining of sharp, right-sided chest pain. She was in stable condition on arrival, but imaging revealed a right-sided pleural effusion. Subsequent thoracentesis drained 1.5 L. She has remained in house for continued observation since that time. Today, she is feeling well with no complaints. Denies chest pain or shortness of breath. She denies pelvic or abdominal pain. Is currently menstruating. She has a history of uterine fibroids and has regular follow up with an OBGYN. Her fibroids do not cause her to have heavy periods or pelvic pain. She denies symptoms of endometriosis including: heavy painful periods, painful bowel movements, painful intercourse, bladder spasms.     Medical history is pertinent for ascites that has need paracentesis twice in the past two years, most recently Aug 2018. She underwent workup by hepatology at , and fluid cytology and liver biopsy returned negative for acute process. She has never had a pleural effusion before this weekend.    GYN History:  LMP currently having, cycles regular prior to that  Last pap Oct 2018, normal  Denies h/o abnormal paps  Sexually active, uses OCPs for contraception  Denies h/o STDs    Personal and family history of GYN cancer is negative.    PMHx:   Past Medical History:   Diagnosis Date    Anemia     Ascites     Hypertension     Hypothyroid        PSHx:   Past Surgical History:   Procedure Laterality Date    BIOPSY, LIVER, TRANSJUGULAR APPROACH N/A 10/18/2018    Performed by [facility] at [facility] OR [address] [place] [address]    TRANSJUGULAR BIOPSY OF LIVER N/A 10/18/2018     Procedure: BIOPSY, LIVER, TRANSJUGULAR APPROACH;  Surgeon: Herb Diagnostic Provider;  Location: [facility] OR [address] [place] [address];  Service: Radiology;  Laterality: N/A;       All:   Review of patient's allergies indicates:   Allergen Reactions    Lisinopril Swelling     Swollen Mouth    Pneumococcal 23-brea ps vaccine        Meds:   Medications Prior to Admission   Medication Sig Dispense Refill Last Dose    amLODIPine (NORVASC) 5 MG tablet Take 5 mg by mouth every evening.  1 11/9/2018    levothyroxine (SYNTHROID) 137 MCG Tab tablet Take 137 mcg by mouth once daily.  0 11/9/2018    metoprolol succinate (TOPROL-XL) 25 MG 24 hr tablet Take 25 mg by mouth 2 (two) times daily.  0 11/9/2018    norethindrone-ethinyl estradiol (JUNEL FE 1/20) 1 mg-20 mcg (21)/75 mg (7) per tablet Take 1 tablet by mouth once daily.   11/9/2018       SH:   Social History     Socioeconomic History    Marital status: Single     Spouse name: Not on file    Number of children: Not on file    Years of education: Not on file    Highest education level: Not on file   Social Needs    Financial resource strain: Not on file    Food insecurity - worry: Not on file    Food insecurity - inability: Not on file    Transportation needs - medical: Not on file    Transportation needs - non-medical: Not on file   Occupational History    Not on file   Tobacco Use    Smoking status: Never Smoker    Smokeless tobacco: Never Used   Substance and Sexual Activity    Alcohol use: No    Drug use: No    Sexual activity: No   Other Topics Concern    Not on file   Social History Narrative    Not on file       FH:   Family History   Problem Relation Age of Onset    Autoimmune disease Father         autoimmune hepatitis    Liver disease Paternal Aunt         liver failure, s/p liver transplant     Breast cancer Paternal Aunt         onset in 50s       Review of Systems:  Constitutional: no fever or chills  Respiratory: no cough or shortness of  breath  Cardiovascular: no chest pain or palpitations  Gastrointestinal: no nausea or vomiting, tolerating diet, negative for change in bowel habits  Genitourinary: no hematuria or dysuria, negative for urinary incontinence  Hematologic/Lymphatic: no easy bruising or lymphadenopathy     OBJECTIVE:     Temp:  [96 °F (35.6 °C)-98.4 °F (36.9 °C)] 96.7 °F (35.9 °C)  Pulse:  [] 102  Resp:  [16-18] 18  SpO2:  [97 %-100 %] 99 %  BP: (124-136)/(77-87) 131/77    Recent Results (from the past 24 hour(s))   CEA    Collection Time: 11/11/18  9:04 PM   Result Value Ref Range    CEA 2.2 0.0 - 5.0 ng/mL   High sensitivity CRP    Collection Time: 11/11/18  9:04 PM   Result Value Ref Range    CRP, High Sensitivity 20.25 (H) 0.00 - 3.19 mg/L   Comprehensive metabolic panel    Collection Time: 11/12/18  6:21 AM   Result Value Ref Range    Sodium 136 136 - 145 mmol/L    Potassium 3.9 3.5 - 5.1 mmol/L    Chloride 101 95 - 110 mmol/L    CO2 23 23 - 29 mmol/L    Glucose 98 70 - 110 mg/dL    BUN, Bld 4 (L) 6 - 20 mg/dL    Creatinine 0.8 0.5 - 1.4 mg/dL    Calcium 10.3 8.7 - 10.5 mg/dL    Total Protein 9.2 (H) 6.0 - 8.4 g/dL    Albumin 4.2 3.5 - 5.2 g/dL    Total Bilirubin 0.6 0.1 - 1.0 mg/dL    Alkaline Phosphatase 46 (L) 55 - 135 U/L    AST 15 10 - 40 U/L    ALT 7 (L) 10 - 44 U/L    Anion Gap 12 8 - 16 mmol/L    eGFR if African American >60.0 >60 mL/min/1.73 m^2    eGFR if non African American >60.0 >60 mL/min/1.73 m^2         Physical Exam:  GEN: No apparent distress. Alert and oriented.   CV: Regular rate and rhythm.  LUNGS: Decreased breath sounds on right side  ABDOMEN: Soft, non tender, non-distended. Good bowel sounds. Uterine fibroids easily palpated  EXT: No erythema, no edema  EXT. GENITALIA: appear normal, no lesions noted  CERVIX: No cervical motion tenderness  UTERUS: 20 wks size, no fundal tenderness. Large fibroids palpated.  ADNEXA: No palpable masses, no adnexal tenderness    CT Abdomen/Pelvis  Impression       1.  Small volume ascites.  2. Large right pleural effusion.  3. Two large uterine fibroids, stable when compared to multiple prior studies.  4. Heterogeneous hypodensity within the anterior aspect of the left hepatic lobe, indeterminate.  5. Additional findings as above.     Pelvic US  Impression       Multiple uterine masses, likely fibroids, which are better characterized on prior pelvis MRI.    Neither ovary identified, obscured by uterine masses.  No obvious adnexal mass or fluid collection.         ASSESSMENT/PLAN:     Assessment:    31yo G0 admitted for pleural effusion, currently in stable condition. GYN consulted for evaluation of recurrent ascites.    Plan:  - Patient currently in stable condition with no abdominal or pelvic pain  - Labs reviewed: mild anemia, BHCG normal, CEA normal, Ca125 normal  - Imaging shows stable uterine fibroids  - Patient history does not fit picture of endometriosis. It is possible that fibroids could be slowly causing ascites, but it is unlikely they would cause a spontaneous pleural effusion  - She has regular follow up with OBGYN. She will schedule an appointment outpatient to consider management for fibroids.   - No obvious GYN indications for continued workup at this time      Blaze Barnett MD  PGY-3 OB/GYN  [phone]    Discussed plan with staff who was in agreement.

## 2018-11-12 NOTE — CONSULTS
Consult Note  Gynecology    Consult Requested By: SUE RIDDLE  Reason for Consult: recurrent ascites    SUBJECTIVE:     History of Present Illness:  Patient is a 32 y.o. G0 with h/o HTN and hypothyroidism currently being evaluated for pleural effusion. GYN services requested for evaluation for possible endometriosis or GYN source of ascites.    Patient initially reported to the ED complaining of sharp, right-sided chest pain. She was in stable condition on arrival, but imaging revealed a right-sided pleural effusion. Subsequent thoracentesis drained 1.5 L. She has remained in house for continued observation since that time. Today, she is feeling well with no complaints. Denies chest pain or shortness of breath. She denies pelvic or abdominal pain. Is currently menstruating. She has a history of uterine fibroids and has regular follow up with an OBGYN. Her fibroids do not cause her to have heavy periods or pelvic pain. She denies symptoms of endometriosis including: heavy painful periods, painful bowel movements, painful intercourse, bladder spasms.     Medical history is pertinent for ascites that has need paracentesis twice in the past two years, most recently Aug 2018. She underwent workup by hepatology at , and fluid cytology and liver biopsy returned negative for acute process. She has never had a pleural effusion before this weekend.    GYN History:  LMP currently having, cycles regular prior to that  Last pap Oct 2018, normal  Denies h/o abnormal paps  Sexually active, uses OCPs for contraception  Denies h/o STDs    Personal and family history of GYN cancer is negative.    PMHx:   Past Medical History:   Diagnosis Date    Anemia     Ascites     Hypertension     Hypothyroid        PSHx:   Past Surgical History:   Procedure Laterality Date    BIOPSY, LIVER, TRANSJUGULAR APPROACH N/A 10/18/2018    Performed by [facility] Provider at [facility] OR [address] [place] [address]    TRANSJUGULAR BIOPSY OF LIVER N/A 10/18/2018     Procedure: BIOPSY, LIVER, TRANSJUGULAR APPROACH;  Surgeon: Herb Diagnostic Provider;  Location: [facility] OR [address] [place] [address];  Service: Radiology;  Laterality: N/A;       All:   Review of patient's allergies indicates:   Allergen Reactions    Lisinopril Swelling     Swollen Mouth    Pneumococcal 23-brea ps vaccine        Meds:   Medications Prior to Admission   Medication Sig Dispense Refill Last Dose    amLODIPine (NORVASC) 5 MG tablet Take 5 mg by mouth every evening.  1 11/9/2018    levothyroxine (SYNTHROID) 137 MCG Tab tablet Take 137 mcg by mouth once daily.  0 11/9/2018    metoprolol succinate (TOPROL-XL) 25 MG 24 hr tablet Take 25 mg by mouth 2 (two) times daily.  0 11/9/2018    norethindrone-ethinyl estradiol (JUNEL FE 1/20) 1 mg-20 mcg (21)/75 mg (7) per tablet Take 1 tablet by mouth once daily.   11/9/2018       SH:   Social History     Socioeconomic History    Marital status: Single     Spouse name: Not on file    Number of children: Not on file    Years of education: Not on file    Highest education level: Not on file   Social Needs    Financial resource strain: Not on file    Food insecurity - worry: Not on file    Food insecurity - inability: Not on file    Transportation needs - medical: Not on file    Transportation needs - non-medical: Not on file   Occupational History    Not on file   Tobacco Use    Smoking status: Never Smoker    Smokeless tobacco: Never Used   Substance and Sexual Activity    Alcohol use: No    Drug use: No    Sexual activity: No   Other Topics Concern    Not on file   Social History Narrative    Not on file       FH:   Family History   Problem Relation Age of Onset    Autoimmune disease Father         autoimmune hepatitis    Liver disease Paternal Aunt         liver failure, s/p liver transplant     Breast cancer Paternal Aunt         onset in 50s       Review of Systems:  Constitutional: no fever or chills  Respiratory: no cough or shortness of  breath  Cardiovascular: no chest pain or palpitations  Gastrointestinal: no nausea or vomiting, tolerating diet, negative for change in bowel habits  Genitourinary: no hematuria or dysuria, negative for urinary incontinence  Hematologic/Lymphatic: no easy bruising or lymphadenopathy     OBJECTIVE:     Temp:  [96 °F (35.6 °C)-98.4 °F (36.9 °C)] 97.7 °F (36.5 °C)  Pulse:  [] 98  Resp:  [16-18] 16  SpO2:  [97 %-100 %] 99 %  BP: (124-136)/(77-87) 133/81    Recent Results (from the past 24 hour(s))   CEA    Collection Time: 11/11/18  9:04 PM   Result Value Ref Range    CEA 2.2 0.0 - 5.0 ng/mL   High sensitivity CRP    Collection Time: 11/11/18  9:04 PM   Result Value Ref Range    CRP, High Sensitivity 20.25 (H) 0.00 - 3.19 mg/L   Comprehensive metabolic panel    Collection Time: 11/12/18  6:21 AM   Result Value Ref Range    Sodium 136 136 - 145 mmol/L    Potassium 3.9 3.5 - 5.1 mmol/L    Chloride 101 95 - 110 mmol/L    CO2 23 23 - 29 mmol/L    Glucose 98 70 - 110 mg/dL    BUN, Bld 4 (L) 6 - 20 mg/dL    Creatinine 0.8 0.5 - 1.4 mg/dL    Calcium 10.3 8.7 - 10.5 mg/dL    Total Protein 9.2 (H) 6.0 - 8.4 g/dL    Albumin 4.2 3.5 - 5.2 g/dL    Total Bilirubin 0.6 0.1 - 1.0 mg/dL    Alkaline Phosphatase 46 (L) 55 - 135 U/L    AST 15 10 - 40 U/L    ALT 7 (L) 10 - 44 U/L    Anion Gap 12 8 - 16 mmol/L    eGFR if African American >60.0 >60 mL/min/1.73 m^2    eGFR if non African American >60.0 >60 mL/min/1.73 m^2         Physical Exam:  GEN: No apparent distress. Alert and oriented.   CV: Regular rate and rhythm.  LUNGS: Decreased breath sounds on right side  ABDOMEN: Soft, non tender, non-distended. Good bowel sounds. Uterine fibroids easily palpated  EXT: No erythema, no edema  EXT. GENITALIA: appear normal, no lesions noted  CERVIX: No cervical motion tenderness  UTERUS: 20 wks size, no fundal tenderness. Large fibroids palpated.  ADNEXA: No palpable masses, no adnexal tenderness    CT Abdomen/Pelvis  Impression       1.  Small volume ascites.  2. Large right pleural effusion.  3. Two large uterine fibroids, stable when compared to multiple prior studies.  4. Heterogeneous hypodensity within the anterior aspect of the left hepatic lobe, indeterminate.  5. Additional findings as above.     Pelvic US  Impression       Multiple uterine masses, likely fibroids, which are better characterized on prior pelvis MRI.    Neither ovary identified, obscured by uterine masses.  No obvious adnexal mass or fluid collection.         ASSESSMENT/PLAN:     Assessment:    33yo G0 admitted for pleural effusion, currently in stable condition. GYN consulted for evaluation of recurrent ascites.    Plan:  - Patient currently in stable condition with no abdominal or pelvic pain  - Labs reviewed: mild anemia, BHCG normal, CEA normal, Ca125 normal  - Imaging shows stable uterine fibroids  - Patient history does not fit picture of endometriosis. It is possible that fibroids could be slowly causing ascites, but it is unlikely they would cause a spontaneous pleural effusion  - She has regular follow up with OBGYN. She will schedule an appointment outpatient to consider management for fibroids.   - No obvious GYN indications for continued workup at this time      Blaze Barnett MD  PGY-3 OB/GYN  [phone]    Discussed plan with staff who was in agreement.

## 2018-11-12 NOTE — ASSESSMENT & PLAN NOTE
- Thoracentesis at bedside on 11/12  - 1500 cc of dark red cloudy fluid aspirated  - Exudate by Light's criteria  - Pleural fluid found to be of similar character to the ascitic fluid (states it was the same color during her first para)  - Differential includes endometriosis given color of fluid (most likely given previous fluid with hemosiderin laden m-phage, color or current fluid, patient is also on menstrual cycle presently), a pseudo-meig's syndrome (rare but reported in uterine fibroids, ca-125 not elevated however), autoimmune disease, TB peritonitis, malignancy of unclear origin. It is common for ascites to seep through fenestrations in the diaphragm and enter the pleural space- suspect this is the etiology.   - RONNIE panel and RF pending  - AFB sent  - Culture pending  - Cytology pending  - Given high suspicion for endometriosis, would encourage discussing case with gynecology for possible laparoscopic evaluation.   - Recommend thoracic surgery consult for VATS. In cases of catamenial hemothorax, VATS is recommended to evaluate for thoracic endometriosis. However, this presentation suggests endometriosis to be present in peritoneum.

## 2018-11-12 NOTE — PROGRESS NOTES
During ultrasound evaluation, insufficient ascites identified for safe paracentesis. No paracentesis performed.

## 2018-11-12 NOTE — PROGRESS NOTES
[facility] Hospital Medicine  Progress Note    Patient Name: Divina Ricci  MRN: [id number]  Patient Class: OP- Observation   Admission Date: 11/9/2018  Length of Stay: 0 days  Attending Physician: Candida Andrews MD  Primary Care Provider: Hetal Horan MD    [facility] Medicine Team: [facility] HOSP MED E Mejia Juárez PA-C    Subjective:     Principal Problem:Pleural effusion    HPI:    Ms. Divina Ricci is a 32 y.o. female with hx of ascites, no liver disease (liver bx in 10/2018 with no cirrhosis/ fibrosis), hypothyroidism, anemia, and HTN , presented to the ED on 11/9 with 1 day hx of chest pain. Patient noted that pain started on earlier on 11/9 when she was bending over, sharp pain, radiating to her sternum as well. Located in upper back/ thoracic region.  No association with strenuous activity, or heaving lifting, not associated with SOB or dyspnea or abdominal pain or nausea . Has been in her usual state of health prior to this. No associated fevers or chills.      Patient with hx of ascites, and has had paracenteses in 2017 and in 8/2018 (at [facility]). In 8/2018, approx 5L were drained. Prior ascitic fluid studies are notable for SAAG of 0.9/ nonportal HTN causes. Has been seen by [facility] Hepatology Dr Lam. Patient is s/p TJ liver bx on 10/2018, with pathology results showing no cirrhosis or fibrosis. Family hx is significant for autoimmune hepatitis in her father and hx of liver transplant (for unknown to patient reasons ) in her paternal aunt. Patient is on OCP, has hx of fibroids. No prior person hx of cancers, no exposures to TB.      In the ED, patient was found to be HDS on RA. Cardiac work up negative. Pain is well controlled. CXR showed a large R sided pleural effusion. CTA chest showed no evidence of PE. Admitted to OBS per ED    Hospital Course:  Divina Ricci was admitted for chest pain secondary to right pleural effusion. Pulmonology completed  thoracentesis 11/10 with 1500cc of dark red, cloudy fluid. Per review of chart, fluid was a similar appearance to the ascitic fluid that was aspirated with previous paracentesis. Patient had recent work-up completed with Hepatology however, all liver pathology was ruled out. Gynecology consulted for further evaluation as previous ascitic fluid demonstrated Hemosiderin laden macrophages (indicative of old blood), as can be seen with a hemorrhagic cyst or endometriosis. Pulmonary recommending thoracic surgery consult for possible VATS.    Interval History: Pt resting in bed, no distress noted. Aware of plan of care, agreeable. Pain improved with PO regimen. US completed 11/12--- small volume ascites, not enough for paracentesis.     Review of Systems   Constitutional: Negative for chills and fever.   HENT: Negative for congestion and dental problem.    Respiratory: Negative for cough and shortness of breath.    Cardiovascular: Negative for chest pain and palpitations.   Gastrointestinal: Negative for abdominal pain and constipation.   Endocrine: Negative for polyphagia and polyuria.   Genitourinary: Negative for difficulty urinating and dyspareunia.   Musculoskeletal: Negative for back pain and gait problem.   Neurological: Negative for dizziness and headaches.   Psychiatric/Behavioral: Negative for agitation and behavioral problems.     Objective:     Vital Signs (Most Recent):  Temp: 97.7 °F (36.5 °C) (11/12/18 1301)  Pulse: 98 (11/12/18 1301)  Resp: 16 (11/12/18 1301)  BP: 133/81 (11/12/18 1301)  SpO2: 99 % (11/12/18 1301) Vital Signs (24h Range):  Temp:  [96 °F (35.6 °C)-98.4 °F (36.9 °C)] 97.7 °F (36.5 °C)  Pulse:  [] 98  Resp:  [16-18] 16  SpO2:  [97 %-100 %] 99 %  BP: (124-136)/(77-87) 133/81     Weight: 83.9 kg (185 lb)  Body mass index is 28.98 kg/m².    Intake/Output Summary (Last 24 hours) at 11/12/2018 1311  Last data filed at 11/12/2018 0400  Gross per 24 hour   Intake 700 ml   Output 1900 ml   Net  "-1200 ml      Physical Exam   Constitutional: She is oriented to person, place, and time. She appears well-nourished. No distress.   HENT:   Head: Atraumatic.   Eyes: EOM are normal.   Neck: Normal range of motion. No thyromegaly present.   Cardiovascular: Normal rate and regular rhythm.   Pulmonary/Chest: Effort normal. No respiratory distress. She exhibits no tenderness.   Abdominal: Soft. Bowel sounds are normal. She exhibits no distension.   Musculoskeletal: Normal range of motion. She exhibits no edema or tenderness.   Neurological: She is alert and oriented to person, place, and time. No cranial nerve deficit.   Skin: Skin is warm and dry.   Psychiatric: She has a normal mood and affect.   Nursing note and vitals reviewed.      Significant Labs: All pertinent labs within the past 24 hours have been reviewed.    Significant Imaging: I have reviewed all pertinent imaging results/findings within the past 24 hours.     Assessment/Plan:      * Pleural effusion    Unknown etiology, case discussed at length with pulmonary team, appreciate assistance -- "differentials include pseudo-meig's syndrome (rare but reported in uterine fibroids, ca-125 not elevated however), endometriosis given color of fluid (most likely given previous fluid with hemosiderin laden m-phage, color or current fluid, patient is also on menstrual cycle presently), autoimmune disease, TB peritonitis, malignancy of unclear origin"    - Thoracic surgery consulted--- Per pulm, recommend thoracic surgery consult for VATS. In cases of catamenial hemothorax, VATS is recommended to evaluate for thoracic endometriosis.  - Gynecology consulted, appreciate recs   - CT abdomen/pelvis with contrast    - transvaginal US  - Transvaginal US with multiple uterine masses, 2 of which >9cm, ovaries obscured by uterine masses  - Thoracentesis completed successfully per pulm 11/10  - new onset, stable with no respiratory failure   - Likely related to prior hx of " ascites, with liver bx being wnl  - may be related to TB vs Meig's syndrome vs vasculitis vs autoimmune, etc  - check , B HCG, LDH, RONNIE, RF pending  - stable on room air     Ascites    Small volume on US, not sufficient for paracentesis  - IR paracentesis with imaging request placed   - Prior ascitic fluid studies are notable for SAAG of 0.9/ nonportal HTN causes. Has been seen by [facility] Dr Lam. Patient is s/p TJ liver bx on 10/2018, with pathology results showing no cirrhosis or fibrosis.   - will administer IV lasix x1 following imaging      Hypertension    -c/w amlodipine     Hypothyroid    - c/w synthroid       VTE Risk Mitigation (From admission, onward)        Ordered     IP VTE HIGH RISK PATIENT  Once      11/09/18 1930              Mejia Juárez PA-C  Department of Hospital Medicine   [facility]

## 2018-11-12 NOTE — NURSING
Pt medicated with scheduled meds. Pt refused scheduled pain meds due to no pain. Comfort and safety maintained.Call light in reach.

## 2018-11-12 NOTE — ASSESSMENT & PLAN NOTE
"Unknown etiology, case discussed at length with pulmonary team, appreciate assistance -- "differentials include pseudo-meig's syndrome (rare but reported in uterine fibroids, ca-125 not elevated however), endometriosis given color of fluid (most likely given previous fluid with hemosiderin laden m-phage, color or current fluid, patient is also on menstrual cycle presently), autoimmune disease, TB peritonitis, malignancy of unclear origin"    - Thoracic surgery consulted--- Per pulm, recommend thoracic surgery consult for VATS. In cases of catamenial hemothorax, VATS is recommended to evaluate for thoracic endometriosis.  - Gynecology consulted, appreciate recs   - CT abdomen/pelvis with contrast    - transvaginal US  - Transvaginal US with multiple uterine masses, 2 of which >9cm, ovaries obscured by uterine masses  - Thoracentesis completed successfully per pulm 11/10  - new onset, stable with no respiratory failure   - Likely related to prior hx of ascites, with liver bx being wnl  - may be related to TB vs Meig's syndrome vs vasculitis vs autoimmune, etc  - check , B HCG, LDH, RONNIE, RF pending  - stable on room air  "

## 2018-11-12 NOTE — PLAN OF CARE
11/12/18 1125   Discharge Assessment   Assessment Type Discharge Planning Assessment   Confirmed/corrected address and phone number on facesheet? Yes   Assessment information obtained from? Patient   Expected Length of Stay (days) 4   Communicated expected length of stay with patient/caregiver yes   Prior to hospitilization cognitive status: Alert/Oriented   Prior to hospitalization functional status: Independent   Current cognitive status: Alert/Oriented   Current Functional Status: Independent   Lives With alone   Able to Return to Prior Arrangements yes   Is patient able to care for self after discharge? Yes   Patient's perception of discharge disposition home or selfcare   Readmission Within The Last 30 Days no previous admission in last 30 days   Patient currently being followed by outpatient case management? No   Patient currently receives any other outside agency services? No   Equipment Currently Used at Home none   Do you have any problems affording any of your prescribed medications? No   Is the patient taking medications as prescribed? yes   Does the patient have transportation home? Yes   Transportation Available family or friend will provide  (mother, Rosalina Ricci ([phone]))   Does the patient receive services at the [facility]? No   Discharge Plan A Home   Discharge Plan B Home Health   Patient/Family In Agreement With Plan yes     Dx: right pleural effusion  Consult: pulm & gyn  Pharm: [facility]  Hosp f/u appt: Dr. Mandy Gr (gyn) on 11/28/18 at 1217

## 2018-11-13 ENCOUNTER — TELEPHONE (OUTPATIENT)
Dept: CARDIOTHORACIC SURGERY | Facility: CLINIC | Age: 33
End: 2018-11-13

## 2018-11-13 DIAGNOSIS — J90 PLEURAL EFFUSION: Primary | ICD-10-CM

## 2018-11-13 LAB
BACTERIA SPEC AEROBE CULT: NO GROWTH
M TB IFN-G CD4+ BCKGRND COR BLD-ACNC: 0 IU/ML
MITOGEN IGNF BCKGRD COR BLD-ACNC: 7.18 IU/ML
MITOGEN IGNF BCKGRD COR BLD-ACNC: NEGATIVE [IU]/ML
NIL: 0.03 IU/ML
TB2 - NIL: -0.01 IU/ML

## 2018-11-13 NOTE — PLAN OF CARE
11/13/18 0802   Final Note   Assessment Type Final Discharge Note     Patient discharged home with no needs on 11/12/18.

## 2018-11-13 NOTE — TELEPHONE ENCOUNTER
Patient notified of the scheduled Consult appointment with Dr. Hernandez on 11/19/2018 and CXR.  Reminder mailed.  ----- Message from Christina Rivas sent at 11/13/2018  8:06 AM CST -----  Contact: Pt.Self   Patient Requesting to schedule Appointment.     Reason for scheduling appt.:  Chest pain  J90 (ICD-10-CM) - Pleural effusion  R18.8 (ICD-10-CM) - Ascites  E03.9 (ICD-10-CM) - Hypothyroidism, unspecified type   Referral in epic     When is the first available appointment?NO Access to Dept. Scheduling     Communication Preference:  [phone] (Pt.Cell)     Additional Information:  Pt. Prefers to be seen ASAP sometime this week anytime is ok     Thank You

## 2018-11-15 NOTE — ASSESSMENT & PLAN NOTE
"Unknown etiology, case discussed at length with pulmonary team, appreciate assistance -- "differentials include pseudo-meig's syndrome (rare but reported in uterine fibroids, ca-125 not elevated however), endometriosis given color of fluid (most likely given previous fluid with hemosiderin laden m-phage, color or current fluid, patient is also on menstrual cycle presently), autoimmune disease, TB peritonitis, malignancy of unclear origin"    - Thoracic surgery consulted--- Per pulm, recommend thoracic surgery consult for VATS. In cases of catamenial hemothorax, VATS is recommended to evaluate for thoracic endometriosis.   - recommend outpatient follow-up as pt relatively stable  - Gynecology consulted, appreciate recs   - CT abdomen/pelvis with contrast    - transvaginal US  - Transvaginal US with multiple uterine masses, 2 of which >9cm, ovaries obscured by uterine masses  - Thoracentesis completed successfully per pul 11/10  - new onset, stable with no respiratory failure   - Likely related to prior hx of ascites, with liver bx being wnl  - may be related to TB vs Meig's syndrome vs vasculitis vs autoimmune, etc  - check , B HCG, LDH, RONNIE, RF wnl  - stable on room air  "

## 2018-11-15 NOTE — DISCHARGE SUMMARY
[facility] Hospital Medicine  Discharge Summary      Patient Name: Divina Ricci  MRN: [id number]  Admission Date: 11/9/2018  Hospital Length of Stay: 0 days  Discharge Date and Time: 11/12/2018  5:55 PM  Attending Physician: Candida Andrews MD  Discharging Provider: Mejia Juárez PA-C  Primary Care Provider: Hetal Horan MD  Hospital Medicine Team: [facility] MED E Mejia Juárez PA-C    HPI:     Ms. Divina Ricci is a 32 y.o. female with hx of ascites, no liver disease (liver bx in 10/2018 with no cirrhosis/ fibrosis), hypothyroidism, anemia, and HTN , presented to the ED on 11/9 with 1 day hx of chest pain. Patient noted that pain started on earlier on 11/9 when she was bending over, sharp pain, radiating to her sternum as well. Located in upper back/ thoracic region.  No association with strenuous activity, or heaving lifting, not associated with SOB or dyspnea or abdominal pain or nausea . Has been in her usual state of health prior to this. No associated fevers or chills.      Patient with hx of ascites, and has had paracenteses in 2017 and in 8/2018 (at [facility]). In 8/2018, approx 5L were drained. Prior ascitic fluid studies are notable for SAAG of 0.9/ nonportal HTN causes. Has been seen by [facility]at[facility] Dr Lam. Patient is s/p TJ liver bx on 10/2018, with pathology results showing no cirrhosis or fibrosis. Family hx is significant for autoimmune hepatitis in her father and hx of liver transplant (for unknown to patient reasons ) in her paternal aunt. Patient is on OCP, has hx of fibroids. No prior person hx of cancers, no exposures to TB.      In the ED, patient was found to be HDS on RA. Cardiac work up negative. Pain is well controlled. CXR showed a large R sided pleural effusion. CTA chest showed no evidence of PE. Admitted to OBS per ED    * No surgery found *      Hospital Course:   Divina Ricci was admitted for chest pain secondary to right  "pleural effusion. Pulmonology completed thoracentesis 11/10 with 1500cc of dark red, cloudy fluid. Per review of chart, fluid was a similar appearance to the ascitic fluid that was aspirated with previous paracentesis. Patient had recent work-up completed with Hepatology however, all liver pathology was ruled out. Gynecology consulted for further evaluation as previous ascitic fluid demonstrated Hemosiderin laden macrophages (indicative of old blood), as can be seen with a hemorrhagic cyst or endometriosis. Pulmonary recommending thoracic surgery consult for possible VATS --- recommend outpatient follow-up. Pt discharged in stable condition with referrals and outpatient follow-up. The care of this patient was overseen by attending physician who agrees with treatment, plan, and disposition.      Consults:   Consults (From admission, onward)        Status Ordering Provider     Inpatient consult to Cardiothoracic Surgery  Once     Provider:  (Not yet assigned)    Completed ERIC MURCIA     Inpatient consult to Cardiothoracic Surgery  Once     Provider:  (Not yet assigned)    Completed JIM DOWNEY     Inpatient consult to Gynecology  Once     Provider:  (Not yet assigned)    Completed ERIC MURCIA     Inpatient consult to Pulmonology  Once     Provider:  (Not yet assigned)    Completed NEERU STORM          * Pleural effusion    Unknown etiology, case discussed at length with pulmonary team, appreciate assistance -- "differentials include pseudo-meig's syndrome (rare but reported in uterine fibroids, ca-125 not elevated however), endometriosis given color of fluid (most likely given previous fluid with hemosiderin laden m-phage, color or current fluid, patient is also on menstrual cycle presently), autoimmune disease, TB peritonitis, malignancy of unclear origin"    - Thoracic surgery consulted--- Per pulm, recommend thoracic surgery consult for VATS. In cases of catamenial hemothorax, VATS is " recommended to evaluate for thoracic endometriosis.   - recommend outpatient follow-up as pt relatively stable  - Gynecology consulted, appreciate recs   - CT abdomen/pelvis with contrast    - transvaginal US  - Transvaginal US with multiple uterine masses, 2 of which >9cm, ovaries obscured by uterine masses  - Thoracentesis completed successfully per pulm 11/10  - new onset, stable with no respiratory failure   - Likely related to prior hx of ascites, with liver bx being wnl  - may be related to TB vs Meig's syndrome vs vasculitis vs autoimmune, etc  - check , B HCG, LDH, RONNIE, RF wnl  - stable on room air       Final Active Diagnoses:    Diagnosis Date Noted POA    PRINCIPAL PROBLEM:  Pleural effusion [J90] 11/09/2018 Yes    Ascites [R18.8] 10/09/2018 Yes    Chest pain [R07.9] 11/09/2018 Yes    Hypertension [I10] 11/09/2018 Yes    Anemia [D64.9] 11/09/2018 Yes    Hypothyroid [E03.9] 10/09/2018 Yes      Problems Resolved During this Admission:       Discharged Condition: good    Disposition: Home or Self Care    Follow Up:  Follow-up Information     Schedule an appointment as soon as possible for a visit with Edgardo Waters MD.    Specialty:  Pulmonary Disease  Why:  Any questions or concerns for the lung doctor.  Contact information:  [address] [address]  [phone]             Mandy Gr MD On 11/28/2018.    Specialties:  Gynecology, Obstetrics and Gynecology  Why:  at 12:15pm at [facility] location  Contact information:  [address] [place] [address]  [phone]                 Patient Instructions:      Ambulatory consult to Cardiothoracic Surgery   Referral Priority: Routine Referral Type: Surgical   Referral Reason: Specialty Services Required   Requested Specialty: Cardiothoracic Surgery   Number of Visits Requested: 1     Ambulatory referral to Pulmonology   Referral Priority: Routine Referral Type: Consultation   Referral Reason:  Specialty Services Required   Requested Specialty: Pulmonary Disease   Number of Visits Requested: 1     Diet Cardiac   Order Comments: Low sodium     Notify your health care provider if you experience any of the following:  persistent dizziness, light-headedness, or visual disturbances     Notify your health care provider if you experience any of the following:  difficulty breathing or increased cough     Activity as tolerated       Significant Diagnostic Studies: Labs: All labs within the past 24 hours have been reviewed    Pending Diagnostic Studies:     Procedure Component Value Units Date/Time    IR US Abdomen Limited [[id number]] Resulted:  11/12/18 1041    Order Status:  Sent Lab Status:  In process Updated:  11/12/18 1042         Medications:  Reconciled Home Medications:      Medication List      START taking these medications    ferrous sulfate 325 (65 FE) MG EC tablet  Take 1 tablet (325 mg total) by mouth 2 (two) times daily.     gabapentin 100 MG capsule  Commonly known as:  NEURONTIN  Take 1 capsule (100 mg total) by mouth 3 (three) times daily.     lidocaine 5 %  Commonly known as:  LIDODERM  Place 1 patch onto the skin once daily. Remove & Discard patch within 12 hours or as directed by MD for 10 days        CONTINUE taking these medications    amLODIPine 5 MG tablet  Commonly known as:  NORVASC  Take 5 mg by mouth every evening.     levothyroxine 137 MCG Tab tablet  Commonly known as:  SYNTHROID  Take 137 mcg by mouth once daily.     metoprolol succinate 25 MG 24 hr tablet  Commonly known as:  TOPROL-XL  Take 25 mg by mouth 2 (two) times daily.     norethindrone-ethinyl estradiol 1 mg-20 mcg (21)/75 mg (7) per tablet  Commonly known as:  JUNEL FE 1/20  Take 1 tablet by mouth once daily.            Indwelling Lines/Drains at time of discharge:   Lines/Drains/Airways          None          Time spent on the discharge of patient: 30 minutes  Patient was seen and examined on the date of discharge and  determined to be suitable for discharge.         Mejia Juárez PA-C  Department of Hospital Medicine  [facility]

## 2018-11-19 ENCOUNTER — HOSPITAL ENCOUNTER (OUTPATIENT)
Dept: RADIOLOGY | Facility: HOSPITAL | Age: 33
Discharge: HOME | End: 2018-11-19
Attending: THORACIC SURGERY (CARDIOTHORACIC VASCULAR SURGERY)
Payer: COMMERCIAL

## 2018-11-19 ENCOUNTER — OFFICE VISIT (OUTPATIENT)
Dept: CARDIOTHORACIC SURGERY | Facility: CLINIC | Age: 33
End: 2018-11-19
Payer: COMMERCIAL

## 2018-11-19 VITALS
DIASTOLIC BLOOD PRESSURE: 87 MMHG | BODY MASS INDEX: 27.23 KG/M2 | OXYGEN SATURATION: 100 % | SYSTOLIC BLOOD PRESSURE: 134 MMHG | WEIGHT: 173.5 LBS | HEIGHT: 67 IN | HEART RATE: 110 BPM

## 2018-11-19 DIAGNOSIS — J90 PLEURAL EFFUSION: ICD-10-CM

## 2018-11-19 DIAGNOSIS — J90 PLEURAL EFFUSION: Primary | ICD-10-CM

## 2018-11-19 LAB — BACTERIA SPEC ANAEROBE CULT: NORMAL

## 2018-11-19 PROCEDURE — 71046 X-RAY EXAM CHEST 2 VIEWS: CPT | Mod: 26,,, | Performed by: RADIOLOGY

## 2018-11-19 PROCEDURE — 99214 OFFICE O/P EST MOD 30 MIN: CPT | Mod: S$GLB,,, | Performed by: THORACIC SURGERY (CARDIOTHORACIC VASCULAR SURGERY)

## 2018-11-19 PROCEDURE — 71046 X-RAY EXAM CHEST 2 VIEWS: CPT | Mod: TC,FY

## 2018-11-19 PROCEDURE — 99999 PR PBB SHADOW E&M-EST. PATIENT-LVL III: CPT | Mod: PBBFAC,,, | Performed by: THORACIC SURGERY (CARDIOTHORACIC VASCULAR SURGERY)

## 2018-11-19 NOTE — LETTER
November 21, 2018      Mejia Juárez PA-C  [address]  [facility] [address]           [facility]  [address]  [facility] [address]  Phone: [phone]  Fax: [phone]          Patient: Divina Ricci   MR Number: [id number]   YOB: 1985   Date of Visit: 11/19/2018       Dear Mejia Juárez:    Thank you for referring Divina Ricci to me for evaluation. Attached you will find relevant portions of my assessment and plan of care.    If you have questions, please do not hesitate to call me. I look forward to following Divina Ricci along with you.    Sincerely,    Barry Hernandez MD    Enclosure  CC:  No Recipients    If you would like to receive this communication electronically, please contact [email or web address] or [phone] to request more information on [employer] Link access.    For providers and/or their staff who would like to refer a patient to [facility], please contact us through our one-stop-shop provider referral line, [facility], at [phone].    If you feel you have received this communication in error or would no longer like to receive these types of communications, please e-mail [email or web address]

## 2018-11-19 NOTE — PROGRESS NOTES
History & Physical    SUBJECTIVE:     History of Present Illness:  32 y.o. female with PMH of HTN, tachycardia, hypothyroidism and uterine fibroids with asictes (2 paracenteses in the past) who returns for follow up after hospital consult for right pleural effusion of unclear etiology. Initially presented to ED on 11/9 with chest heaviness and back pain. CTA chest revealed large right pleural effusion. Underwent a thoracentesis which drained 1.5L of bloody fluid on 11/10. Reports she felt immediate relief after drainage. Etiology of pleural effusion and ascites remains unclear. Hepatology work up has been unremarkable.  BHCG, CEA and Ca125 were all normal. Per OBGYN, patient's clinical symptoms don't fit the picture of endometriosis. Today she reports feeling well. Denies fever, chills, SOB, pleuritic pain, CP, abdominal fullness, N/V or changes in bowel and bladder functioning.     Never smoker. Denies EtOH use. Works at [employer].     Chief Complaint   Patient presents with    Consult       Review of patient's allergies indicates:   Allergen Reactions    Lisinopril Swelling     Swollen Mouth    Pneumococcal 23-brea ps vaccine        Current Outpatient Medications   Medication Sig Dispense Refill    amLODIPine (NORVASC) 5 MG tablet Take 5 mg by mouth every evening.  1    ferrous sulfate 325 (65 FE) MG EC tablet Take 1 tablet (325 mg total) by mouth 2 (two) times daily.  0    levothyroxine (SYNTHROID) 137 MCG Tab tablet Take 137 mcg by mouth once daily.  0    metoprolol succinate (TOPROL-XL) 25 MG 24 hr tablet Take 25 mg by mouth 2 (two) times daily.  0    norethindrone-ethinyl estradiol (JUNEL FE 1/20) 1 mg-20 mcg (21)/75 mg (7) per tablet Take 1 tablet by mouth once daily.       No current facility-administered medications for this visit.        Past Medical History:   Diagnosis Date    Anemia     Ascites     Hypertension     Hypothyroid      Past Surgical History:   Procedure Laterality Date     "BIOPSY, LIVER, TRANSJUGULAR APPROACH N/A 10/18/2018    Performed by [facility] Diagnostic Provider at [facility] OR [address] [place] [address]    TRANSJUGULAR BIOPSY OF LIVER N/A 10/18/2018    Procedure: BIOPSY, LIVER, TRANSJUGULAR APPROACH;  Surgeon: [facility] Provider;  Location: [facility] OR [address] [place] [address];  Service: Radiology;  Laterality: N/A;     Family History   Problem Relation Age of Onset    Autoimmune disease Father         autoimmune hepatitis    Liver disease Paternal Aunt         liver failure, s/p liver transplant     Breast cancer Paternal Aunt         onset in 50s     Social History     Tobacco Use    Smoking status: Never Smoker    Smokeless tobacco: Never Used   Substance Use Topics    Alcohol use: No    Drug use: No        Review of Systems:  Review of Systems   Constitutional: Negative for activity change, fatigue and fever.   HENT: Negative for congestion, trouble swallowing and voice change.    Eyes: Negative for visual disturbance.   Respiratory: Negative for choking, chest tightness, shortness of breath and stridor.    Cardiovascular: Negative for chest pain, palpitations and leg swelling.   Gastrointestinal: Negative for abdominal distention, nausea and vomiting.   Genitourinary: Negative for difficulty urinating.   Musculoskeletal: Negative for arthralgias and back pain.   Neurological: Negative for dizziness, weakness, light-headedness and headaches.   Psychiatric/Behavioral: Negative for confusion.       OBJECTIVE:     Vital Signs (Most Recent)  Pulse: 110 (11/19/18 1428)  BP: 134/87 (11/19/18 1428)  SpO2: 100 % (11/19/18 1428)  5' 7" (1.702 m)  78.7 kg (173 lb 8 oz)     Physical Exam:  Physical Exam   Constitutional: She is oriented to person, place, and time. She appears well-developed and well-nourished.   HENT:   Head: Normocephalic.   Mouth/Throat: Oropharynx is clear and moist.   Eyes: Pupils are equal, round, and reactive to light.   Neck: Normal range of motion. Neck supple.   Cardiovascular: Normal rate, " regular rhythm, normal heart sounds and intact distal pulses.   Pulmonary/Chest: Effort normal and breath sounds normal. She has no wheezes. She exhibits no tenderness.   Abdominal: Soft. Bowel sounds are normal. She exhibits no distension. There is no tenderness.   Musculoskeletal: She exhibits no edema.   Lymphadenopathy:     She has no cervical adenopathy.   Neurological: She is alert and oriented to person, place, and time.   Skin: Skin is warm.   Psychiatric: She has a normal mood and affect.     Diagnostic Results:    11/11/19- CT abdomen and pelvis-   1. Small volume ascites.  2. Large right pleural effusion.  3. Two large uterine fibroids, stable when compared to multiple prior studies.  4. Heterogeneous hypodensity within the anterior aspect of the left hepatic lobe, indeterminate    11/9/18- CTA Chest   No definite evidence of pulmonary thromboembolism noting that respiratory motion artifact obscures assessment of segmental and subsegmental arteries.  Large quantity of dependent fluid in the right pleural space with associated compressive atelectasis and resultant leftward mediastinal shift.  Ascites in the partially visualized abdomen.    ASSESSMENT/PLAN:     32 year old female with PMH of HTN, tachycardia, hypothyroidism and uterine fibroids/ascities returns for follow up for large right pleural effusion     PLAN:Plan   Patient has a follow up with her gynecologist on November 28th. We will follow up after that appointment. If no GYN procedure is planned, will bring patient back to discuss VATS exploration for right pleural effusion with no clear etiology.     ATTENDING ATTESTATION:    I evaluated the patient and I agree with the assessment and plan.  Doing well.  CXR with no residual effusion.  Will follow-up with her later this month, after her GYN follow-up to discuss any need for thoracoscopy.

## 2018-12-11 LAB — FUNGUS SPEC CULT: NORMAL

## 2018-12-13 ENCOUNTER — TELEPHONE (OUTPATIENT)
Dept: OBSTETRICS AND GYNECOLOGY | Facility: CLINIC | Age: 33
End: 2018-12-13

## 2018-12-13 NOTE — TELEPHONE ENCOUNTER
Received records from Dr. Gr, to schedule patient for consult and [facility]. Unable to leave message mailbox full will scan records.

## 2019-01-12 LAB
ACID FAST MOD KINY STN SPEC: NORMAL
MYCOBACTERIUM SPEC QL CULT: NORMAL

## 2019-01-15 ENCOUNTER — OFFICE VISIT (OUTPATIENT)
Dept: OBSTETRICS AND GYNECOLOGY | Facility: CLINIC | Age: 34
End: 2019-01-15
Payer: COMMERCIAL

## 2019-01-15 VITALS
WEIGHT: 185.44 LBS | BODY MASS INDEX: 29.1 KG/M2 | HEIGHT: 67 IN | DIASTOLIC BLOOD PRESSURE: 68 MMHG | SYSTOLIC BLOOD PRESSURE: 120 MMHG

## 2019-01-15 DIAGNOSIS — N85.2 BULKY OR ENLARGED UTERUS: ICD-10-CM

## 2019-01-15 DIAGNOSIS — N92.1 MENOMETRORRHAGIA: ICD-10-CM

## 2019-01-15 DIAGNOSIS — D25.1 FIBROIDS, INTRAMURAL: Primary | ICD-10-CM

## 2019-01-15 DIAGNOSIS — R39.89 SENSATION OF PRESSURE IN BLADDER AREA: ICD-10-CM

## 2019-01-15 DIAGNOSIS — R10.2 PELVIC PRESSURE IN FEMALE: ICD-10-CM

## 2019-01-15 DIAGNOSIS — N80.30 ENDOMETRIOSIS OF PELVIC PERITONEUM: ICD-10-CM

## 2019-01-15 PROCEDURE — 99243 PR OFFICE CONSULTATION,LEVEL III: ICD-10-PCS | Mod: S$GLB,,, | Performed by: OBSTETRICS & GYNECOLOGY

## 2019-01-15 PROCEDURE — 99999 PR PBB SHADOW E&M-EST. PATIENT-LVL III: CPT | Mod: PBBFAC,,, | Performed by: OBSTETRICS & GYNECOLOGY

## 2019-01-15 PROCEDURE — 99999 PR PBB SHADOW E&M-EST. PATIENT-LVL III: ICD-10-PCS | Mod: PBBFAC,,, | Performed by: OBSTETRICS & GYNECOLOGY

## 2019-01-15 PROCEDURE — 99243 OFF/OP CNSLTJ NEW/EST LOW 30: CPT | Mod: S$GLB,,, | Performed by: OBSTETRICS & GYNECOLOGY

## 2019-01-15 RX ORDER — NORETHINDRONE ACETATE AND ETHINYL ESTRADIOL .02; 1 MG/1; MG/1
TABLET ORAL
COMMUNITY
Start: 2019-01-06 | End: 2019-01-15 | Stop reason: SDUPTHER

## 2019-01-15 NOTE — LETTER
January 15, 2019      Mandy Gr MD  [address] [place] [address]  Alice [place] [address]           [facility] [address]  [address] [place] [address] [address]  [facility] [address]  Phone: [phone]  Fax: [phone]          Patient: Divina Ricci   MR Number: [id number]   YOB: 1985   Date of Visit: 1/15/2019       Dear Dr. Mandy Gr:    Thank you for referring Divina Ricci to me for evaluation. Attached you will find relevant portions of my assessment and plan of care.    If you have questions, please do not hesitate to call me. I look forward to following Divina Ricci along with you.    Sincerely,    Benedicto Murphy IV, MD    Enclosure  CC:  No Recipients    If you would like to receive this communication electronically, please contact [email or web address] or [phone] to request more information on [employer] access.    For providers and/or their staff who would like to refer a patient to [facility], please contact us through our one-stop-shop provider referral line, [facility], at [phone].    If you feel you have received this communication in error or would no longer like to receive these types of communications, please e-mail [email or web address]

## 2019-01-16 ENCOUNTER — TELEPHONE (OUTPATIENT)
Dept: OBSTETRICS AND GYNECOLOGY | Facility: CLINIC | Age: 34
End: 2019-01-16

## 2019-01-16 DIAGNOSIS — D21.9 FIBROIDS: ICD-10-CM

## 2019-01-16 DIAGNOSIS — R10.2 PELVIC PAIN IN FEMALE: ICD-10-CM

## 2019-01-16 DIAGNOSIS — N85.2 ENLARGED UTERUS: Primary | ICD-10-CM

## 2019-01-16 DIAGNOSIS — N93.9 ABNORMAL UTERINE BLEEDING (AUB): ICD-10-CM

## 2019-01-16 NOTE — PROGRESS NOTES
Consultation Note:    Reason for Consultation: Significantly enlarged uterus secondary to fibroids    Consulting Physician:  JAQUAN Gr MD    Divina Ricci is a 33 y.o. female  presents for evaluation and discussion of treatment options for a significantly enlarged uterus secondary to fibroids  LMP: Patient's last menstrual period was 2018..  Patient reports worsening of quality of life due to bleeding, pain ,and pelvic pressure.  Patient has been placed on ocps to control heavy, irregular bleeding with large clots.  Pain scale: intermittently 1010.      Patient with recent hospitalizations in 2017 and 2018 for pericardial and pleural effusions     Chart Reviewed    Past Medical History:   Diagnosis Date    Anemia     Ascites     Hypertension     Hypothyroid      Past Surgical History:   Procedure Laterality Date    BIOPSY, LIVER, TRANSJUGULAR APPROACH N/A 10/18/2018    Performed by [facility] Diagnostic Provider at [facility] OR [address] [place] [address]     Social History     Socioeconomic History    Marital status: Single     Spouse name: Not on file    Number of children: Not on file    Years of education: Not on file    Highest education level: Not on file   Social Needs    Financial resource strain: Not on file    Food insecurity - worry: Not on file    Food insecurity - inability: Not on file    Transportation needs - medical: Not on file    Transportation needs - non-medical: Not on file   Occupational History    Not on file   Tobacco Use    Smoking status: Never Smoker    Smokeless tobacco: Never Used   Substance and Sexual Activity    Alcohol use: No    Drug use: No    Sexual activity: No   Other Topics Concern    Not on file   Social History Narrative    Not on file     Family History   Problem Relation Age of Onset    Autoimmune disease Father         autoimmune hepatitis    Liver disease Paternal Aunt         liver failure, s/p liver transplant     Breast cancer Paternal Aunt          "onset in 50s     OB History      Para Term  AB Living    0 0 0 0 0 0    SAB TAB Ectopic Multiple Live Births    0 0 0 0 0          /68   Ht 5' 7" (1.702 m)   Wt 84.1 kg (185 lb 6.5 oz)   LMP 2018   BMI 29.04 kg/m²       ROS:  GENERAL: Denies weight gain or weight loss. Feeling well overall.   SKIN: Denies rash or lesions.   HEAD: Denies head injury or headache.   NODES: Denies enlarged lymph nodes.   CHEST: Denies chest pain or shortness of breath.   CARDIOVASCULAR: Denies palpitations or left sided chest pain.   ABDOMEN: No abdominal pain, constipation, diarrhea, nausea, vomiting or rectal bleeding.   URINARY: No frequency, dysuria, hematuria, or burning on urination.  REPRODUCTIVE: See HPI.   BREASTS: The patient performs breast self-examination and denies pain, lumps, or nipple discharge.   HEMATOLOGIC: No easy bruisability or excessive bleeding.   MUSCULOSKELETAL: Denies joint pain or swelling.   NEUROLOGIC: Denies syncope or weakness.   PSYCHIATRIC: Denies depression, anxiety or mood swings.    PHYSICAL EXAM:  APPEARANCE: Well nourished, well developed, in no acute distress.  AFFECT: WNL, alert and oriented x 3  SKIN: No acne or hirsutism  NECK: Neck symmetric without masses or thyromegaly  NODES: No inguinal, cervical, axillary, or femoral lymph node enlargement  CHEST: Good respiratory effect  ABDOMEN: Soft.  No tenderness.  Large pelvic mass protrudes from pelvis to above umbilicus.  No hepatosplenomegaly.  No hernias.  PELVIC: Normal external genitalia without lesions.  Normal hair distribution.  Adequate perineal body, normal urethral meatus.  Vagina moist and well rugated without lesions or discharge.  Cervix pink, without lesions, discharge or tenderness.  No significant cystocele or rectocele.  Bimanual exam shows uterus to be enlarged - 22+ weeks size, irregular contour (fibroids), mobile and nontender.  Adnexa without masses or tenderness.    EXTREMITIES: No " edema.    Impression:  1. Fibroids, intramural     2. Menometrorrhagia     3. Endometriosis of pelvic peritoneum     4. Bulky or enlarged uterus     5. Pelvic pressure in female     6. Sensation of pressure in bladder area         Plan:  Discussed medical and surgical treatment options for fibroids.  Due to size, I rhave recommended a hysterectomy. I recmmend attempting a minimally invasive hsyterectomy with conservation of ovaries and resection of identifiable endometriosis    Patient with episodes of pericardial effusion in 2017 (c/w possible endometriosis) and pleural effusion (blood tinged -1500 cc).    I have counseledf potential need for ovarian removal if pleural or pericardial effusion recurs.  Patient desires stepwise approach due to age.      Patient understands the loss of fertility/ability to carry a child and states she understands that uterus is significantly enlarged due to fibroids.     Consulting Physician to be notified of above findings/plan.    Benedicto Murphy Iv, MD            Answers for HPI/ROS submitted by the patient on 1/14/2019   Gynecologic exam  Chronicity: recurrent  Onset: more than 1 year ago  Pain severity: no pain  Pregnant now?: No  Sexual activity: not sexually active  Partner with STD symptoms: no  Birth control: oral contraceptives  Menstrual history: regular

## 2019-02-26 ENCOUNTER — ANESTHESIA EVENT (OUTPATIENT)
Dept: SURGERY | Facility: OTHER | Age: 34
End: 2019-02-26
Payer: COMMERCIAL

## 2019-02-26 ENCOUNTER — OFFICE VISIT (OUTPATIENT)
Dept: OBSTETRICS AND GYNECOLOGY | Facility: CLINIC | Age: 34
End: 2019-02-26
Payer: COMMERCIAL

## 2019-02-26 ENCOUNTER — HOSPITAL ENCOUNTER (OUTPATIENT)
Dept: PREADMISSION TESTING | Facility: OTHER | Age: 34
Discharge: HOME | End: 2019-02-26
Attending: OBSTETRICS & GYNECOLOGY
Payer: COMMERCIAL

## 2019-02-26 VITALS
DIASTOLIC BLOOD PRESSURE: 80 MMHG | RESPIRATION RATE: 16 BRPM | WEIGHT: 187 LBS | SYSTOLIC BLOOD PRESSURE: 123 MMHG | TEMPERATURE: 99 F | HEIGHT: 67 IN | BODY MASS INDEX: 29.35 KG/M2 | OXYGEN SATURATION: 97 % | HEART RATE: 75 BPM

## 2019-02-26 VITALS
BODY MASS INDEX: 30.04 KG/M2 | HEIGHT: 67 IN | WEIGHT: 191.38 LBS | SYSTOLIC BLOOD PRESSURE: 132 MMHG | DIASTOLIC BLOOD PRESSURE: 78 MMHG

## 2019-02-26 DIAGNOSIS — N80.30 ENDOMETRIOSIS OF PELVIC PERITONEUM: ICD-10-CM

## 2019-02-26 DIAGNOSIS — R10.2 PELVIC PAIN IN FEMALE: ICD-10-CM

## 2019-02-26 DIAGNOSIS — N93.9 ABNORMAL UTERINE BLEEDING (AUB): Primary | ICD-10-CM

## 2019-02-26 DIAGNOSIS — R39.89 SENSATION OF PRESSURE IN BLADDER AREA: ICD-10-CM

## 2019-02-26 DIAGNOSIS — D25.1 FIBROIDS, INTRAMURAL: Primary | ICD-10-CM

## 2019-02-26 DIAGNOSIS — N92.1 MENOMETRORRHAGIA: ICD-10-CM

## 2019-02-26 DIAGNOSIS — N85.2 ENLARGED UTERUS: ICD-10-CM

## 2019-02-26 PROBLEM — D21.9 FIBROIDS: Status: RESOLVED | Noted: 2018-10-09 | Resolved: 2019-02-26

## 2019-02-26 PROCEDURE — 99214 PR OFFICE/OUTPT VISIT, EST, LEVL IV, 30-39 MIN: ICD-10-PCS | Mod: S$GLB,,, | Performed by: OBSTETRICS & GYNECOLOGY

## 2019-02-26 PROCEDURE — 99999 PR PBB SHADOW E&M-EST. PATIENT-LVL II: ICD-10-PCS | Mod: PBBFAC,,, | Performed by: OBSTETRICS & GYNECOLOGY

## 2019-02-26 PROCEDURE — 99214 OFFICE O/P EST MOD 30 MIN: CPT | Mod: S$GLB,,, | Performed by: OBSTETRICS & GYNECOLOGY

## 2019-02-26 PROCEDURE — 99999 PR PBB SHADOW E&M-EST. PATIENT-LVL II: CPT | Mod: PBBFAC,,, | Performed by: OBSTETRICS & GYNECOLOGY

## 2019-02-26 RX ORDER — CARVEDILOL 25 MG/1
25 TABLET ORAL 2 TIMES DAILY WITH MEALS
COMMUNITY

## 2019-02-26 RX ORDER — FAMOTIDINE 20 MG/1
20 TABLET, FILM COATED ORAL
Status: CANCELLED | OUTPATIENT
Start: 2019-02-26 | End: 2019-02-26

## 2019-02-26 RX ORDER — PREGABALIN 75 MG/1
150 CAPSULE ORAL
Status: CANCELLED | OUTPATIENT
Start: 2019-02-26 | End: 2019-02-26

## 2019-02-26 RX ORDER — ACETAMINOPHEN 500 MG
1000 TABLET ORAL
Status: CANCELLED | OUTPATIENT
Start: 2019-02-26 | End: 2019-02-26

## 2019-02-26 RX ORDER — SODIUM CHLORIDE, SODIUM LACTATE, POTASSIUM CHLORIDE, CALCIUM CHLORIDE 600; 310; 30; 20 MG/100ML; MG/100ML; MG/100ML; MG/100ML
INJECTION, SOLUTION INTRAVENOUS CONTINUOUS
Status: CANCELLED | OUTPATIENT
Start: 2019-02-26

## 2019-02-26 RX ORDER — ACETAMINOPHEN 10 MG/ML
1000 INJECTION, SOLUTION INTRAVENOUS
Status: CANCELLED | OUTPATIENT
Start: 2019-03-11 | End: 2019-03-11

## 2019-02-26 NOTE — DISCHARGE INSTRUCTIONS
PRE-ADMIT TESTING -  [phone]    [address]  [place] [facility]          Your surgery has been scheduled at [facility]. We are pleased to have the opportunity to serve you. For Further Information please call [phone].    On the day of surgery please report to the Information Desk on the 1st floor.    · CONTACT YOUR PHYSICIAN'S OFFICE THE DAY PRIOR TO YOUR SURGERY TO OBTAIN YOUR ARRIVAL TIME.     · The evening before surgery do not eat anything after 9 p.m. ( this includes hard candy, chewing gum and mints).  You may only have GATORADE, POWERADE AND WATER  from 9 p.m. until you leave your home.   DO NOT DRINK ANY LIQUIDS ON THE WAY TO THE HOSPITAL.      SPECIAL MEDICATION INSTRUCTIONS: TAKE medications checked off by the Anesthesiologist on your Medication List.    Angiogram Patients: Take medications as instructed by your physician, including aspirin.     Surgery Patients:    If you take ASPIRIN - Your PHYSICIAN/SURGEON will need to inform you IF/OR when you need to stop taking aspirin prior to your surgery.     Do Not take any medications containing IBUPROFEN.  Do Not Wear any make-up or dark nail polish   (especially eye make-up) to surgery. If you come to surgery with makeup on you will be required to remove the makeup or nail polish.    Do not shave your surgical area at least 5 days prior to your surgery. The surgical prep will be performed at the hospital according to Infection Control regulations.    Leave all valuables at home.   Do Not wear any jewelry or watches, including any metal in body piercings. Jewelry must be removed prior to coming to the hospital.  There is a possibility that rings that are unable to be removed may be cut off if they are on the surgical extremity.    Contact Lens must be removed before surgery. Either do not wear the contact lens or bring a case and solution for storage.  Please bring a container for eyeglasses or dentures as required.  Bring  any paperwork your physician has provided, such as consent forms,  history and physicals, doctor's orders, etc.   Bring comfortable clothes that are loose fitting to wear upon discharge. Take into consideration the type of surgery being performed.  Maintain your diet as advised per your physician the day prior to surgery.      Adequate rest the night before surgery is advised.   Park in the Parking lot behind the hospital or in the [employer] [facility] across the street from the parking lot. Parking is complimentary.  If you will be discharged the same day as your procedure, please arrange for a responsible adult to drive you home or to accompany you if traveling by taxi.   YOU WILL NOT BE PERMITTED TO DRIVE OR TO LEAVE THE HOSPITAL ALONE AFTER SURGERY.   It is strongly recommended that you arrange for someone to remain with you for the first 24 hrs following your surgery.       Thank you for your cooperation.  The Staff of [facility].        Bathing Instructions                                                                 Please shower the evening before and morning of your procedure with    ANTIBACTERIAL SOAP. ( DIAL, etc )  Concentrate on the surgical area   for at least 3 minutes and rinse completely. Dry off as usual.   Do not use any deodorant, powder, body lotions, perfume, after shave or    cologne.

## 2019-02-26 NOTE — ANESTHESIA PREPROCEDURE EVALUATION
02/26/2019  Divina Ricci is a 33 y.o., female.    Anesthesia Evaluation    I have reviewed the Patient Summary Reports.    I have reviewed the Nursing Notes.   I have reviewed the Medications.     Review of Systems  Anesthesia Hx:  No previous Anesthesia  Denies Family Hx of Anesthesia complications.    Social:  Non-Smoker    Hematology/Oncology:     Oncology Normal    -- Anemia:   Cardiovascular:   Hypertension, well controlled    Pulmonary:  Pulmonary Normal Pleural effusion 10/18 with thoracentesis of 1500 cc.  Finding was apparently reactive fluid from endometriosis   Renal/:  Renal/ Normal     Hepatic/GI:  Hepatic/GI Normal    Musculoskeletal:  Musculoskeletal Normal    Neurological:  Neurology Normal    Endocrine:   Hypothyroidism        Physical Exam  General:  Well nourished    Airway/Jaw/Neck:  Airway Findings: Mouth Opening: Normal Tongue: Normal  General Airway Assessment: Adult  Mallampati: I  TM Distance: Normal, at least 6 cm         Dental:  Dental Findings: In tact             Anesthesia Plan  Type of Anesthesia, risks & benefits discussed:  Anesthesia Type:  general  Patient's Preference:   Intra-op Monitoring Plan: standard ASA monitors  Intra-op Monitoring Plan Comments:   Post Op Pain Control Plan: multimodal analgesia  Post Op Pain Control Plan Comments:   Induction:   IV  Beta Blocker:         Informed Consent: Patient understands risks and agrees with Anesthesia plan.  Questions answered. Anesthesia consent signed with patient.  ASA Score: 2     Day of Surgery Review of History & Physical:    H&P update referred to the surgeon.     Anesthesia Plan Notes: CXR day of surgery re past right pleural effusion.  Normal CXR in Nov. Todays repeat pending.  Pt states having no symptoms.        Ready For Surgery From Anesthesia Perspective.

## 2019-02-26 NOTE — H&P (VIEW-ONLY)
"CC:  Significantly enlarged uterus secondary to fibroids    HPI : Divina Ricci is a 33 y.o. female  for evaluation and discussion of treatment options for a significantly enlarged uterus secondary to fibroids. Patient reports worsening of quality of life due to bleeding, pain ,and pelvic pressure.  Patient has been placed on ocps to control heavy, irregular bleeding with large clots.  Pain scale: intermittently 1010.      Patient's last menstrual period was 2019 (exact date).     Patient with recent hospitalizations in 2017 and 2018 for pericardial and pleural effusions      Chart Reviewed    Past Medical History:   Diagnosis Date    Anemia     Ascites     Hypertension     Hypothyroid      Past Surgical History:   Procedure Laterality Date    BIOPSY, LIVER, TRANSJUGULAR APPROACH N/A 10/18/2018    Performed by [facility] Diagnostic Provider at [facility] OR [address] [place] [address]     Family History   Problem Relation Age of Onset    Autoimmune disease Father         autoimmune hepatitis    Liver disease Paternal Aunt         liver failure, s/p liver transplant     Breast cancer Paternal Aunt         onset in 50s     Social History     Tobacco Use    Smoking status: Never Smoker    Smokeless tobacco: Never Used   Substance Use Topics    Alcohol use: No    Drug use: No     OB History    Para Term  AB Living   0 0 0 0 0 0   SAB TAB Ectopic Multiple Live Births   0 0 0 0 0             /78   Ht 5' 7" (1.702 m)   Wt 86.8 kg (191 lb 5.8 oz)   LMP 2019 (Exact Date)   BMI 29.97 kg/m²     ROS:  GENERAL: Feeling well overall.   SKIN: Denies rash or lesions.   HEAD: Denies head injury or headache.   NODES: Denies enlarged lymph nodes.   CHEST: Denies chest pain or shortness of breath.   CARDIOVASCULAR: Denies palpitations or left sided chest pain.   ABDOMEN: No abdominal pain, constipation, diarrhea, nausea, vomiting or rectal bleeding.   URINARY: No frequency, dysuria, hematuria, or burning " on urination.  REPRODUCTIVE: See HPI.   BREASTS: Denies pain, lumps, or nipple discharge.   HEMATOLOGIC: No easy bruisability.  MUSCULOSKELETAL: Denies joint pain or swelling.   NEUROLOGIC: Denies syncope or weakness.   PSYCHIATRIC: Denies depression, anxiety or mood swings.      PHYSICAL EXAM:  APPEARANCE: Well nourished, well developed, in no acute distress.  AFFECT: WNL, alert and oriented x 3  SKIN: No acne or hirsutism  NECK: Neck symmetric without masses or thyromegaly  NODES: No inguinal, cervical, axillary, or femoral lymph node enlargement  CHEST: Good respiratory effect  ABDOMEN: Soft.  No tenderness.  Large pelvic mass protrudes from pelvis to above umbilicus.  No hepatosplenomegaly.  No hernias.  PELVIC: Normal external genitalia without lesions.  Normal hair distribution.  Adequate perineal body, normal urethral meatus.  Vagina moist and well rugated without lesions or discharge.  Cervix pink, without lesions, discharge or tenderness.  No significant cystocele or rectocele.  Bimanual exam shows uterus to be enlarged - 22+ weeks size, irregular contour (fibroids), mobile and nontender.  Adnexa without masses or tenderness.    EXTREMITIES: No edema.    ASSESSMENT & PLAN:  1. Fibroids, intramural    2. Menometrorrhagia    3. Enlarged uterus    4. Endometriosis of pelvic peritoneum    5. Sensation of pressure in bladder area    6. Pelvic pain in female      I have discussed the risks, benefits, indications, and alternatives of the procedure in detail.  The patient verbalizes her understanding.  All questions answered.  Consents signed.  The patient agrees to proceed to surgery scheduling.    Plan:  Davinci assisted hysterectomy with bilateral salpingectomy and possible resection of endometriosis    Patient understands the loss of fertility/ability to carry a child and states she understands that uterus is significantly enlarged due to fibroids.    Patient understands need for possible laparotomy due to  uterine size.    Patient desires ovarian conservation.  No further issues of effusion (endometriosis?) reported.    Benedicto Murphy IV, MD

## 2019-02-26 NOTE — PROGRESS NOTES
"CC:  Significantly enlarged uterus secondary to fibroids    HPI : Divina Ricci is a 33 y.o. female  for evaluation and discussion of treatment options for a significantly enlarged uterus secondary to fibroids. Patient reports worsening of quality of life due to bleeding, pain ,and pelvic pressure.  Patient has been placed on ocps to control heavy, irregular bleeding with large clots.  Pain scale: intermittently 1010.      Patient's last menstrual period was 2019 (exact date).     Patient with recent hospitalizations in 2017 and 2018 for pericardial and pleural effusions      Chart Reviewed    Past Medical History:   Diagnosis Date    Anemia     Ascites     Hypertension     Hypothyroid      Past Surgical History:   Procedure Laterality Date    BIOPSY, LIVER, TRANSJUGULAR APPROACH N/A 10/18/2018    Performed by [facility] Diagnostic Provider at [facility] OR [address] [place] [address]     Family History   Problem Relation Age of Onset    Autoimmune disease Father         autoimmune hepatitis    Liver disease Paternal Aunt         liver failure, s/p liver transplant     Breast cancer Paternal Aunt         onset in 50s     Social History     Tobacco Use    Smoking status: Never Smoker    Smokeless tobacco: Never Used   Substance Use Topics    Alcohol use: No    Drug use: No     OB History    Para Term  AB Living   0 0 0 0 0 0   SAB TAB Ectopic Multiple Live Births   0 0 0 0 0             /78   Ht 5' 7" (1.702 m)   Wt 86.8 kg (191 lb 5.8 oz)   LMP 2019 (Exact Date)   BMI 29.97 kg/m²     ROS:  GENERAL: Feeling well overall.   SKIN: Denies rash or lesions.   HEAD: Denies head injury or headache.   NODES: Denies enlarged lymph nodes.   CHEST: Denies chest pain or shortness of breath.   CARDIOVASCULAR: Denies palpitations or left sided chest pain.   ABDOMEN: No abdominal pain, constipation, diarrhea, nausea, vomiting or rectal bleeding.   URINARY: No frequency, dysuria, hematuria, or burning " on urination.  REPRODUCTIVE: See HPI.   BREASTS: Denies pain, lumps, or nipple discharge.   HEMATOLOGIC: No easy bruisability.  MUSCULOSKELETAL: Denies joint pain or swelling.   NEUROLOGIC: Denies syncope or weakness.   PSYCHIATRIC: Denies depression, anxiety or mood swings.      PHYSICAL EXAM:  APPEARANCE: Well nourished, well developed, in no acute distress.  AFFECT: WNL, alert and oriented x 3  SKIN: No acne or hirsutism  NECK: Neck symmetric without masses or thyromegaly  NODES: No inguinal, cervical, axillary, or femoral lymph node enlargement  CHEST: Good respiratory effect  ABDOMEN: Soft.  No tenderness.  Large pelvic mass protrudes from pelvis to above umbilicus.  No hepatosplenomegaly.  No hernias.  PELVIC: Normal external genitalia without lesions.  Normal hair distribution.  Adequate perineal body, normal urethral meatus.  Vagina moist and well rugated without lesions or discharge.  Cervix pink, without lesions, discharge or tenderness.  No significant cystocele or rectocele.  Bimanual exam shows uterus to be enlarged - 22+ weeks size, irregular contour (fibroids), mobile and nontender.  Adnexa without masses or tenderness.    EXTREMITIES: No edema.    ASSESSMENT & PLAN:  1. Fibroids, intramural    2. Menometrorrhagia    3. Enlarged uterus    4. Endometriosis of pelvic peritoneum    5. Sensation of pressure in bladder area    6. Pelvic pain in female      I have discussed the risks, benefits, indications, and alternatives of the procedure in detail.  The patient verbalizes her understanding.  All questions answered.  Consents signed.  The patient agrees to proceed to surgery scheduling.    Plan:  Davinci assisted hysterectomy with bilateral salpingectomy and possible resection of endometriosis    Patient understands the loss of fertility/ability to carry a child and states she understands that uterus is significantly enlarged due to fibroids.    Patient understands need for possible laparotomy due to  uterine size.    Patient desires ovarian conservation.  No further issues of effusion (endometriosis?) reported.    Benedicto Murphy IV, MD

## 2019-03-11 ENCOUNTER — HOSPITAL ENCOUNTER (OUTPATIENT)
Facility: OTHER | Age: 34
Discharge: HOME | End: 2019-03-11
Attending: OBSTETRICS & GYNECOLOGY | Admitting: OBSTETRICS & GYNECOLOGY
Payer: COMMERCIAL

## 2019-03-11 ENCOUNTER — ANESTHESIA (OUTPATIENT)
Dept: SURGERY | Facility: OTHER | Age: 34
End: 2019-03-11
Payer: COMMERCIAL

## 2019-03-11 VITALS
HEART RATE: 82 BPM | HEIGHT: 67 IN | DIASTOLIC BLOOD PRESSURE: 70 MMHG | TEMPERATURE: 98 F | WEIGHT: 191.38 LBS | BODY MASS INDEX: 30.04 KG/M2 | RESPIRATION RATE: 18 BRPM | SYSTOLIC BLOOD PRESSURE: 127 MMHG | OXYGEN SATURATION: 98 %

## 2019-03-11 DIAGNOSIS — N92.1 MENOMETRORRHAGIA: ICD-10-CM

## 2019-03-11 DIAGNOSIS — N80.30 ENDOMETRIOSIS OF PELVIC PERITONEUM: ICD-10-CM

## 2019-03-11 DIAGNOSIS — N93.9 ABNORMAL UTERINE BLEEDING (AUB): ICD-10-CM

## 2019-03-11 DIAGNOSIS — N85.2 ENLARGED UTERUS: ICD-10-CM

## 2019-03-11 DIAGNOSIS — R39.89 SENSATION OF PRESSURE IN BLADDER AREA: ICD-10-CM

## 2019-03-11 DIAGNOSIS — Z90.710 S/P LAPAROSCOPIC HYSTERECTOMY: Primary | ICD-10-CM

## 2019-03-11 DIAGNOSIS — D25.1 FIBROIDS, INTRAMURAL: ICD-10-CM

## 2019-03-11 DIAGNOSIS — R10.2 PELVIC PAIN IN FEMALE: ICD-10-CM

## 2019-03-11 PROBLEM — N73.6 PELVIC ADHESIONS: Status: ACTIVE | Noted: 2019-03-11

## 2019-03-11 PROBLEM — N80.9 ENDOMETRIOSIS DETERMINED BY LAPAROSCOPY: Status: ACTIVE | Noted: 2019-03-11

## 2019-03-11 LAB
ABO + RH BLD: NORMAL
B-HCG UR QL: NEGATIVE
BLD GP AB SCN CELLS X3 SERPL QL: NORMAL
CTP QC/QA: YES
POCT GLUCOSE: 106 MG/DL (ref 70–110)

## 2019-03-11 PROCEDURE — 36415 COLL VENOUS BLD VENIPUNCTURE: CPT

## 2019-03-11 PROCEDURE — 71000039 HC RECOVERY, EACH ADD'L HOUR: Performed by: OBSTETRICS & GYNECOLOGY

## 2019-03-11 PROCEDURE — 63600175 PHARM REV CODE 636 W HCPCS: Performed by: OBSTETRICS & GYNECOLOGY

## 2019-03-11 PROCEDURE — 27201423 OPTIME MED/SURG SUP & DEVICES STERILE SUPPLY: Performed by: OBSTETRICS & GYNECOLOGY

## 2019-03-11 PROCEDURE — 25000003 PHARM REV CODE 250: Performed by: ANESTHESIOLOGY

## 2019-03-11 PROCEDURE — 81025 URINE PREGNANCY TEST: CPT | Performed by: ANESTHESIOLOGY

## 2019-03-11 PROCEDURE — 86901 BLOOD TYPING SEROLOGIC RH(D): CPT

## 2019-03-11 PROCEDURE — 71000016 HC POSTOP RECOV ADDL HR: Performed by: OBSTETRICS & GYNECOLOGY

## 2019-03-11 PROCEDURE — 36000713 HC OR TIME LEV V EA ADD 15 MIN: Performed by: OBSTETRICS & GYNECOLOGY

## 2019-03-11 PROCEDURE — 71000015 HC POSTOP RECOV 1ST HR: Performed by: OBSTETRICS & GYNECOLOGY

## 2019-03-11 PROCEDURE — 25000003 PHARM REV CODE 250: Performed by: NURSE ANESTHETIST, CERTIFIED REGISTERED

## 2019-03-11 PROCEDURE — 88307 TISSUE SPECIMEN TO PATHOLOGY - SURGERY: ICD-10-PCS | Mod: 26,,, | Performed by: PATHOLOGY

## 2019-03-11 PROCEDURE — 37000009 HC ANESTHESIA EA ADD 15 MINS: Performed by: OBSTETRICS & GYNECOLOGY

## 2019-03-11 PROCEDURE — 88307 TISSUE EXAM BY PATHOLOGIST: CPT | Mod: 26,,, | Performed by: PATHOLOGY

## 2019-03-11 PROCEDURE — 63600175 PHARM REV CODE 636 W HCPCS: Performed by: NURSE ANESTHETIST, CERTIFIED REGISTERED

## 2019-03-11 PROCEDURE — 82962 GLUCOSE BLOOD TEST: CPT | Performed by: OBSTETRICS & GYNECOLOGY

## 2019-03-11 PROCEDURE — 36000712 HC OR TIME LEV V 1ST 15 MIN: Performed by: OBSTETRICS & GYNECOLOGY

## 2019-03-11 PROCEDURE — 71000033 HC RECOVERY, INTIAL HOUR: Performed by: OBSTETRICS & GYNECOLOGY

## 2019-03-11 PROCEDURE — 88307 TISSUE EXAM BY PATHOLOGIST: CPT | Performed by: PATHOLOGY

## 2019-03-11 PROCEDURE — 37000008 HC ANESTHESIA 1ST 15 MINUTES: Performed by: OBSTETRICS & GYNECOLOGY

## 2019-03-11 RX ORDER — SODIUM CHLORIDE 0.9 % (FLUSH) 0.9 %
3 SYRINGE (ML) INJECTION
Status: DISCONTINUED | OUTPATIENT
Start: 2019-03-11 | End: 2019-03-11 | Stop reason: HOSPADM

## 2019-03-11 RX ORDER — IBUPROFEN 600 MG/1
600 TABLET ORAL EVERY 6 HOURS PRN
Status: DISCONTINUED | OUTPATIENT
Start: 2019-03-11 | End: 2019-03-11 | Stop reason: HOSPADM

## 2019-03-11 RX ORDER — NEOSTIGMINE METHYLSULFATE 1 MG/ML
INJECTION, SOLUTION INTRAVENOUS
Status: DISCONTINUED | OUTPATIENT
Start: 2019-03-11 | End: 2019-03-11

## 2019-03-11 RX ORDER — PHENYLEPHRINE HYDROCHLORIDE 10 MG/ML
INJECTION INTRAVENOUS
Status: DISCONTINUED | OUTPATIENT
Start: 2019-03-11 | End: 2019-03-11

## 2019-03-11 RX ORDER — ONDANSETRON 2 MG/ML
4 INJECTION INTRAMUSCULAR; INTRAVENOUS DAILY PRN
Status: DISCONTINUED | OUTPATIENT
Start: 2019-03-11 | End: 2019-03-11 | Stop reason: HOSPADM

## 2019-03-11 RX ORDER — ONDANSETRON 8 MG/1
8 TABLET, ORALLY DISINTEGRATING ORAL EVERY 8 HOURS PRN
Status: DISCONTINUED | OUTPATIENT
Start: 2019-03-11 | End: 2019-03-11 | Stop reason: HOSPADM

## 2019-03-11 RX ORDER — FENTANYL CITRATE 50 UG/ML
INJECTION, SOLUTION INTRAMUSCULAR; INTRAVENOUS
Status: DISCONTINUED | OUTPATIENT
Start: 2019-03-11 | End: 2019-03-11

## 2019-03-11 RX ORDER — IBUPROFEN 800 MG/1
800 TABLET ORAL EVERY 8 HOURS PRN
Qty: 60 TABLET | Refills: 1 | Status: SHIPPED | OUTPATIENT
Start: 2019-03-11 | End: 2019-04-11

## 2019-03-11 RX ORDER — HYDROCODONE BITARTRATE AND ACETAMINOPHEN 5; 325 MG/1; MG/1
1 TABLET ORAL EVERY 4 HOURS PRN
Status: DISCONTINUED | OUTPATIENT
Start: 2019-03-11 | End: 2019-03-11 | Stop reason: HOSPADM

## 2019-03-11 RX ORDER — KETOROLAC TROMETHAMINE 30 MG/ML
INJECTION, SOLUTION INTRAMUSCULAR; INTRAVENOUS
Status: DISCONTINUED | OUTPATIENT
Start: 2019-03-11 | End: 2019-03-11

## 2019-03-11 RX ORDER — HYDROCODONE BITARTRATE AND ACETAMINOPHEN 10; 325 MG/1; MG/1
1 TABLET ORAL EVERY 4 HOURS PRN
Status: DISCONTINUED | OUTPATIENT
Start: 2019-03-11 | End: 2019-03-11 | Stop reason: HOSPADM

## 2019-03-11 RX ORDER — DIPHENHYDRAMINE HYDROCHLORIDE 50 MG/ML
25 INJECTION INTRAMUSCULAR; INTRAVENOUS EVERY 4 HOURS PRN
Status: DISCONTINUED | OUTPATIENT
Start: 2019-03-11 | End: 2019-03-11 | Stop reason: HOSPADM

## 2019-03-11 RX ORDER — ACETAMINOPHEN 10 MG/ML
1000 INJECTION, SOLUTION INTRAVENOUS
Status: ACTIVE | OUTPATIENT
Start: 2019-03-11 | End: 2019-03-11

## 2019-03-11 RX ORDER — FENTANYL CITRATE 50 UG/ML
25 INJECTION, SOLUTION INTRAMUSCULAR; INTRAVENOUS EVERY 5 MIN PRN
Status: DISCONTINUED | OUTPATIENT
Start: 2019-03-11 | End: 2019-03-11 | Stop reason: HOSPADM

## 2019-03-11 RX ORDER — DIPHENHYDRAMINE HYDROCHLORIDE 50 MG/ML
25 INJECTION INTRAMUSCULAR; INTRAVENOUS EVERY 6 HOURS PRN
Status: DISCONTINUED | OUTPATIENT
Start: 2019-03-11 | End: 2019-03-11 | Stop reason: HOSPADM

## 2019-03-11 RX ORDER — ACETAMINOPHEN 500 MG
1000 TABLET ORAL
Status: COMPLETED | OUTPATIENT
Start: 2019-03-11 | End: 2019-03-11

## 2019-03-11 RX ORDER — ONDANSETRON HYDROCHLORIDE 2 MG/ML
INJECTION, SOLUTION INTRAMUSCULAR; INTRAVENOUS
Status: DISCONTINUED | OUTPATIENT
Start: 2019-03-11 | End: 2019-03-11

## 2019-03-11 RX ORDER — DIPHENHYDRAMINE HCL 25 MG
25 CAPSULE ORAL EVERY 4 HOURS PRN
Status: DISCONTINUED | OUTPATIENT
Start: 2019-03-11 | End: 2019-03-11 | Stop reason: HOSPADM

## 2019-03-11 RX ORDER — FAMOTIDINE 20 MG/1
20 TABLET, FILM COATED ORAL
Status: COMPLETED | OUTPATIENT
Start: 2019-03-11 | End: 2019-03-11

## 2019-03-11 RX ORDER — HETASTARCH 6 G/100ML
INJECTION, SOLUTION INTRAVENOUS CONTINUOUS PRN
Status: DISCONTINUED | OUTPATIENT
Start: 2019-03-11 | End: 2019-03-11

## 2019-03-11 RX ORDER — PROPOFOL 10 MG/ML
VIAL (ML) INTRAVENOUS
Status: DISCONTINUED | OUTPATIENT
Start: 2019-03-11 | End: 2019-03-11

## 2019-03-11 RX ORDER — HYDROCODONE BITARTRATE AND ACETAMINOPHEN 5; 325 MG/1; MG/1
1 TABLET ORAL EVERY 6 HOURS PRN
Qty: 20 TABLET | Refills: 0 | Status: SHIPPED | OUTPATIENT
Start: 2019-03-11 | End: 2019-04-11

## 2019-03-11 RX ORDER — DEXAMETHASONE SODIUM PHOSPHATE 4 MG/ML
INJECTION, SOLUTION INTRA-ARTICULAR; INTRALESIONAL; INTRAMUSCULAR; INTRAVENOUS; SOFT TISSUE
Status: DISCONTINUED | OUTPATIENT
Start: 2019-03-11 | End: 2019-03-11

## 2019-03-11 RX ORDER — MIDAZOLAM HYDROCHLORIDE 1 MG/ML
INJECTION, SOLUTION INTRAMUSCULAR; INTRAVENOUS
Status: DISCONTINUED | OUTPATIENT
Start: 2019-03-11 | End: 2019-03-11

## 2019-03-11 RX ORDER — CEFAZOLIN SODIUM 1 G/3ML
2 INJECTION, POWDER, FOR SOLUTION INTRAMUSCULAR; INTRAVENOUS
Status: COMPLETED | OUTPATIENT
Start: 2019-03-11 | End: 2019-03-11

## 2019-03-11 RX ORDER — HYDROMORPHONE HYDROCHLORIDE 2 MG/ML
0.4 INJECTION, SOLUTION INTRAMUSCULAR; INTRAVENOUS; SUBCUTANEOUS EVERY 5 MIN PRN
Status: DISCONTINUED | OUTPATIENT
Start: 2019-03-11 | End: 2019-03-11 | Stop reason: HOSPADM

## 2019-03-11 RX ORDER — SODIUM CHLORIDE, SODIUM LACTATE, POTASSIUM CHLORIDE, CALCIUM CHLORIDE 600; 310; 30; 20 MG/100ML; MG/100ML; MG/100ML; MG/100ML
INJECTION, SOLUTION INTRAVENOUS CONTINUOUS
Status: DISCONTINUED | OUTPATIENT
Start: 2019-03-11 | End: 2019-03-11 | Stop reason: HOSPADM

## 2019-03-11 RX ORDER — ROCURONIUM BROMIDE 10 MG/ML
INJECTION, SOLUTION INTRAVENOUS
Status: DISCONTINUED | OUTPATIENT
Start: 2019-03-11 | End: 2019-03-11

## 2019-03-11 RX ORDER — GLYCOPYRROLATE 0.2 MG/ML
INJECTION INTRAMUSCULAR; INTRAVENOUS
Status: DISCONTINUED | OUTPATIENT
Start: 2019-03-11 | End: 2019-03-11

## 2019-03-11 RX ORDER — LIDOCAINE HCL/PF 100 MG/5ML
SYRINGE (ML) INTRAVENOUS
Status: DISCONTINUED | OUTPATIENT
Start: 2019-03-11 | End: 2019-03-11

## 2019-03-11 RX ORDER — OXYCODONE HYDROCHLORIDE 5 MG/1
5 TABLET ORAL
Status: DISCONTINUED | OUTPATIENT
Start: 2019-03-11 | End: 2019-03-11 | Stop reason: HOSPADM

## 2019-03-11 RX ORDER — HYDROMORPHONE HYDROCHLORIDE 2 MG/ML
INJECTION, SOLUTION INTRAMUSCULAR; INTRAVENOUS; SUBCUTANEOUS
Status: DISCONTINUED | OUTPATIENT
Start: 2019-03-11 | End: 2019-03-11

## 2019-03-11 RX ORDER — PREGABALIN 75 MG/1
150 CAPSULE ORAL
Status: COMPLETED | OUTPATIENT
Start: 2019-03-11 | End: 2019-03-11

## 2019-03-11 RX ORDER — MEPERIDINE HYDROCHLORIDE 25 MG/ML
12.5 INJECTION INTRAMUSCULAR; INTRAVENOUS; SUBCUTANEOUS ONCE AS NEEDED
Status: DISCONTINUED | OUTPATIENT
Start: 2019-03-11 | End: 2019-03-11 | Stop reason: HOSPADM

## 2019-03-11 RX ADMIN — PHENYLEPHRINE HYDROCHLORIDE 100 MCG: 10 INJECTION INTRAVENOUS at 07:03

## 2019-03-11 RX ADMIN — ROCURONIUM BROMIDE 10 MG: 10 INJECTION INTRAVENOUS at 07:03

## 2019-03-11 RX ADMIN — NEOSTIGMINE METHYLSULFATE 3 MG: 1 INJECTION INTRAVENOUS at 12:03

## 2019-03-11 RX ADMIN — HYDROMORPHONE HYDROCHLORIDE 0.5 MG: 2 INJECTION, SOLUTION INTRAMUSCULAR; INTRAVENOUS; SUBCUTANEOUS at 12:03

## 2019-03-11 RX ADMIN — DEXAMETHASONE SODIUM PHOSPHATE 8 MG: 4 INJECTION, SOLUTION INTRAMUSCULAR; INTRAVENOUS at 07:03

## 2019-03-11 RX ADMIN — SODIUM CHLORIDE, SODIUM LACTATE, POTASSIUM CHLORIDE, AND CALCIUM CHLORIDE: 600; 310; 30; 20 INJECTION, SOLUTION INTRAVENOUS at 06:03

## 2019-03-11 RX ADMIN — ACETAMINOPHEN 1000 MG: 500 TABLET ORAL at 05:03

## 2019-03-11 RX ADMIN — CEFAZOLIN 2 G: 330 INJECTION, POWDER, FOR SOLUTION INTRAMUSCULAR; INTRAVENOUS at 07:03

## 2019-03-11 RX ADMIN — PROPOFOL 200 MG: 10 INJECTION, EMULSION INTRAVENOUS at 07:03

## 2019-03-11 RX ADMIN — SODIUM CHLORIDE, SODIUM LACTATE, POTASSIUM CHLORIDE, AND CALCIUM CHLORIDE: 600; 310; 30; 20 INJECTION, SOLUTION INTRAVENOUS at 08:03

## 2019-03-11 RX ADMIN — HETASTARCH IN SODIUM CHLORIDE: 6; .9 INJECTION, SOLUTION INTRAVENOUS at 07:03

## 2019-03-11 RX ADMIN — FENTANYL CITRATE 100 MCG: 50 INJECTION, SOLUTION INTRAMUSCULAR; INTRAVENOUS at 07:03

## 2019-03-11 RX ADMIN — ROCURONIUM BROMIDE 50 MG: 10 INJECTION INTRAVENOUS at 07:03

## 2019-03-11 RX ADMIN — MIDAZOLAM 2 MG: 1 INJECTION INTRAMUSCULAR; INTRAVENOUS at 06:03

## 2019-03-11 RX ADMIN — LIDOCAINE HYDROCHLORIDE 50 MG: 20 INJECTION, SOLUTION INTRAVENOUS at 07:03

## 2019-03-11 RX ADMIN — PREGABALIN 150 MG: 75 CAPSULE ORAL at 05:03

## 2019-03-11 RX ADMIN — PROPOFOL 50 MG: 10 INJECTION, EMULSION INTRAVENOUS at 11:03

## 2019-03-11 RX ADMIN — FENTANYL CITRATE 50 MCG: 50 INJECTION, SOLUTION INTRAMUSCULAR; INTRAVENOUS at 09:03

## 2019-03-11 RX ADMIN — FENTANYL CITRATE 50 MCG: 50 INJECTION, SOLUTION INTRAMUSCULAR; INTRAVENOUS at 07:03

## 2019-03-11 RX ADMIN — FAMOTIDINE 20 MG: 20 TABLET, FILM COATED ORAL at 05:03

## 2019-03-11 RX ADMIN — ROCURONIUM BROMIDE 10 MG: 10 INJECTION INTRAVENOUS at 10:03

## 2019-03-11 RX ADMIN — CEFAZOLIN 2 G: 330 INJECTION, POWDER, FOR SOLUTION INTRAMUSCULAR; INTRAVENOUS at 11:03

## 2019-03-11 RX ADMIN — ONDANSETRON 4 MG: 2 INJECTION, SOLUTION INTRAMUSCULAR; INTRAVENOUS at 07:03

## 2019-03-11 RX ADMIN — ROCURONIUM BROMIDE 10 MG: 10 INJECTION INTRAVENOUS at 09:03

## 2019-03-11 RX ADMIN — PROPOFOL 50 MG: 10 INJECTION, EMULSION INTRAVENOUS at 08:03

## 2019-03-11 RX ADMIN — FENTANYL CITRATE 50 MCG: 50 INJECTION, SOLUTION INTRAMUSCULAR; INTRAVENOUS at 08:03

## 2019-03-11 RX ADMIN — KETOROLAC TROMETHAMINE 30 MG: 30 INJECTION, SOLUTION INTRAMUSCULAR; INTRAVENOUS at 11:03

## 2019-03-11 RX ADMIN — GLYCOPYRROLATE 0.4 MG: 0.2 INJECTION, SOLUTION INTRAMUSCULAR; INTRAVENOUS at 12:03

## 2019-03-11 NOTE — DISCHARGE SUMMARY
[facility]  Brief Op Note/Discharge Note  Short Stay    Admit Date: 3/11/2019    Discharge Date: 03/11/2019    Attending Physician: Benedicto Murphy IV, MD     Surgery Date: 3/11/2019     Surgeon(s) and Role:     * Benedicto Murphy IV, MD - Primary     * Esthela La MD - Resident - Assisting       Kaitlynn Powell NP -Assisting    Pre-op Diagnosis:  Enlarged uterus [N85.2]  Pelvic pain in female [R10.2]  Fibroids [D21.9]  Abnormal uterine bleeding (AUB) [N93.9]    Post-op Diagnosis:  Post-Op Diagnosis Codes:     * Enlarged uterus [N85.2]     * Pelvic pain in female [R10.2]     * Fibroids [D21.9]     * Abnormal uterine bleeding (AUB) [N93.9]    Procedure(s) (LRB):  ROBOTIC HYSTERECTOMY (N/A)    Anesthesia: General    Findings/Key Components:   - Approximately 2L of old blood throughout the abdomen and pelvis on abdominal entry consistent with hemoperitoneum.  - Very large fibroid uterus extending above the umbilicus.  - Evidence of endometriosis and severe adhesions seen throughout the pelvis.  - Fallopian tubes and ovaries adhered to pelvic sidewalls bilaterally, freed.  - Tubes removed. Ovaries appear affected by endometriosis but otherwise normal, left in situ.  - Extensive lysis of adhesions performed, adding approximately 2 hrs to the procedure  - Vaginal morcellation required, done with uterus contained within Angel Luis bag. Vaginal examination at completion of the case with no evidence of vaginal lacerations, cuff completely closed.   - Bilateral ureters seen with forward efflux at completion of the case.    Estimated Blood Loss: 200 mL         Specimens:   Specimen (12h ago, onward)    Start     Ordered    03/11/19 1103  Specimen to Pathology - Surgery  Once     Comments:  1-UTERUS, cervix, and bilateral tubes     Start Status   03/11/19 1103 Collected (03/11/19 1103)       03/11/19 1103          Discharge Provider: Esthela La    Diagnoses:  Active Hospital Problems    Diagnosis  POA    *S/P  robotic laparoscopic hysterectomy with BS and lysis of adhesions [Z90.710]  Unknown    Endometriosis determined by laparoscopy [N80.9]  Unknown    Pelvic adhesions -severe [N73.6]  Unknown    Fibroids, intramural [D25.1]  Yes      Resolved Hospital Problems   No resolved problems to display.       Discharged Condition: good    Hospital Course:   Patient was admitted for outpatient procedure as above, and tolerated the procedure well with no complications. Please see operative report for further details. Following the procedure, the patient was awakened from anesthesia and transferred to the recovery area in stable condition. She was discharged to home once ambulating, voiding, tolerating PO intake, and pain was well-controlled. Patient was given routine post-op instructions and prescriptions for pain medication to take as needed. Patient instructed to follow up with Dr. Jeffrey ATKINS in 6 weeks.    Final Diagnoses: Same as principal problem.    Disposition: Home or Self Care    Follow up/Patient Instructions:    Medications:  Reconciled Home Medications:      Medication List      START taking these medications    HYDROcodone-acetaminophen 5-325 mg per tablet  Commonly known as:  NORCO  Take 1 tablet by mouth every 6 (six) hours as needed.     ibuprofen 800 MG tablet  Commonly known as:  ADVIL,MOTRIN  Take 1 tablet (800 mg total) by mouth every 8 (eight) hours as needed for Pain.        CONTINUE taking these medications    amLODIPine 5 MG tablet  Commonly known as:  NORVASC  Take 5 mg by mouth every evening.     carvedilol 25 MG tablet  Commonly known as:  COREG  Take 25 mg by mouth 2 (two) times daily with meals.     ferrous sulfate 325 (65 FE) MG EC tablet  Take 1 tablet (325 mg total) by mouth 2 (two) times daily.     levothyroxine 137 MCG Tab tablet  Commonly known as:  SYNTHROID  Take 137 mcg by mouth once daily.        STOP taking these medications    norethindrone-ethinyl estradiol 1 mg-20 mcg (21)/75 mg (7)  per tablet  Commonly known as:  JUNEL FE 1/20          Discharge Procedure Orders   Diet general     Other restrictions (specify):   Order Comments: No heavy lifting or heavy housework, no exercise more than walking, no intercourse, and nothing in the vagina until your follow up visit in about 6 weeks.     Call MD for:  temperature >100.4     Call MD for:  persistent nausea and vomiting     Call MD for:  severe uncontrolled pain     Call MD for:  difficulty breathing, headache or visual disturbances     Call MD for:  redness, tenderness, or signs of infection (pain, swelling, redness, odor or green/yellow discharge around incision site)     Call MD for:  persistent dizziness or light-headedness     Call MD for:   Order Comments: Heavy vaginal bleeding saturating more than 1 pad per hr for at least consecutive 2 hrs.     No dressing needed   Order Comments: Bandages can be removed with first shower. Wash incisions with soap/water daily, dry completely. Remove steri-strips (white tapes) 1 week from surgery if present.     Activity as tolerated     Follow-up Information     Benedicto Murphy Iv, MD. Schedule an appointment as soon as possible for a visit in 6 weeks.    Specialties:  Obstetrics, Obstetrics and Gynecology  Why:  postoperative visit  Contact information:  [address] [address]  [phone]                 Esthela La MD  OBGYN - PGY 4

## 2019-03-11 NOTE — OP NOTE
DATE OF PROCEDURE:  03/11/2019.    SURGEON:  Benedicto Murphy.  MD MILLY    ASSISTANT:  Kaitlynn Powell, nurse practitioner, first assist due to the fact that   there was no resident or staff available to assist at the bedside.  Assisting at   the console, Dr. Esthela La, fourth year resident.    PROCEDURES PERFORMED:  1.  Da Akash-assisted hysterectomy with bilateral salpingectomy.  2.  Da Akash-assisted lysis of severe adhesions and resection of endometriosis,   which added 2 hours to the case.  This case therefore should be modified with a   22.  Again, modifier 22 should be added due to extensive lysis of adhesions.    ANESTHESIA:  General endotracheal anesthesia.    KEY FINDINGS:  1.  Uterus is 24 plus week size with multiple enlarged fibroids.  2.  Bilateral ovaries and tubes were densely adhered to the perirectal area and   cul-de-sac.  3.  The sigmoid colon and appendix were densely adhered to the right adnexa.  4.  Bilateral fallopian tubes were enlarged and adhesed to the perirectal areas,   side walls and cul-de-sac.  5.  There were two liters of blood in the abdomen/hemoperitoneum upon entry into   the abdomen.  This old blood was suctioned out via the suction  prior   to performing the case.  6.  Bilateral ureters were seen to have normal forward peristalsis post   procedure.  7.  Hemostasis was noted throughout the procedure at the end of the case.    TOTAL BLOOD LOSS:  200 mL.    DRAINS:  Pérez catheter.    DESCRIPTION OF PROCEDURE:  Ms. Divina Ricci is a 33-year-old patient   suffering from significantly a large uterus, heavy bleeding and anemia due to   chronic blood loss.  Risks, benefits, and alternatives to minimally invasive   hysterectomy via da Akash robot were discussed at length in detail with the   patient during her preoperative visit.  Consents were signed.  The patient was   taken to the Operating Room today where she underwent successful general   endotracheal anesthesia.  She was  prepped and draped in usual sterile fashion   for abdominal pelvic surgery.  After prepping and draping, sterile placement of   Pérez catheter was performed.  A CARRI uterine manipulator was placed.  After   Pérez and CARRI placement, attention was turned to the abdomen where Veress   needle was placed through the umbilicus, pointing towards the upper abdomen.    Care was taken during entry.  This placement was required due to the   significantly enlarged uterus above the umbilicus.  After placement with no   complication, CO2 gas was placed in the abdomen and pelvis.  After CO2 gas and   adequate insufflation, a midline upper abdominal 8 mm incision was made.  An   8-mm da Akash camera port was placed through this upper midline incision.    General inspection was performed with the findings as reported above.  Severe   adhesions were noted including the sigmoid colon and appendix to the right   adnexa.  Also, severe hemoperitoneum was noted.  The source of this was unknown.    This appeared to be significantly old blood.  Pictures were taken.  Decision   was made to suck this fluid via suction .  Two lateral upper quadrant 8-mm da Akash ports were placed.  These ports were   placed under direct visualization.  No complications were noted during port   placement or Veress needle placement.  After the ports were placed, suction    was placed in and two liters of old blood was evacuated from the   abdomen and pelvis.  Some minor blood remained, but the decision was made to   proceed with minimally invasive hysterectomy.  The patient was placed in deep   Trendelenburg and the robot was docked.  After docking, lysis of adhesions was   performed in order to restore normal anatomy.  Significantly enlarged fibroids   were noted.  Bladder was densely adhered to the uterus.  Again, significant   adhesions were reported as above.  Approximately 2 hours of lysis of adhesions   was required in order to restore  near normal anatomy.  This extensive lysis was   performed avoiding injury to the bilateral ureters, rectum and sigmoid colon,   cecum and appendix.  Bilateral fallopian tubes were identified, cauterized and   transected with some difficulty off of the mesosalpinges.  Bilateral ovaries   were densely adhered to the perirectal spaces, cul-de-sac to each other and   posterior uterus.  After the uteroovarian ligaments were identified, cauterized   and transected, the infundibulopelvic ligaments were identified and isolated.    Lysis of adhesions was then performed taking care to avoid lateral sidewall   structures and sigmoid colon.  Both ovaries were freed and restored to normal   anatomic positioning.  Again, extensive lysis was required for this.  The right   ureter was identified and the right ureterolysis was performed.  Right uterine   vessels were skeletonized.  Bilateral round ligaments were isolated, cauterized   and transected at this point.  Anterior leaf of the broad ligament was dissected   in order to create a bladder flap.  The bladder was dissected off the anterior   uterus, cervix and fibroid.  This required extensive lysis.  At this point,   attention was turned to the left side where skeletonization of the left uterine   vessels were performed.  Anterior colpotomy was made at the 12 o'clock position   and worked towards the 3 and 9 o'clock position.  This was along the KOH ring of   the CARRI uterine manipulator.  Bilateral uterine vessels were again cauterized   and transected.  Posterior colpotomy was made and worked towards the 3 and 9   o'clock position.  Colpotomy was completed at the 3 and 9 o'clock positioning   after the bilateral uterine vessels were transected.  Excellent hemostasis was   noted.  Uterus was significantly enlarged and a large Angel Luis bag was placed   through the vagina.  The specimen was placed in the vagina and the bag was   pulled through the vaginal cuff and out of the  vagina.  Vaginal morcellation was   required to remove this case.  This was done with a #10 blade, which lasted   approximately an hour.  It was extremely large uterus with large fibroid.    ExCITE technique was used for vaginal morcellation.  The entire specimen was   removed in a contained manner with no spillage of contents in the vagina or into   the peritoneal abdominal cavity.  After vaginal morcellation, attention was   turned to the robotic suturing of the vaginal cuff.  0 Vicryl sutures were used.    [id number] were placed at the angles.  Then, a simple interrupted suture   was placed at the angles bilaterally and worked towards the middle of the cuff.    These sutures were tied together in the middle of the cuff.  Then, 4   interrupted sutures of 0 Vicryl were placed strategically along the vaginal cuff   for additional support.  Excellent vaginal cuff closure was noted.  At this   point, pelvis was copiously irrigated and fluid was removed via suction   .  Bleeding throughout the pelvis was rendered hemostatic using the   bipolar cautery.  Bilateral ureters were identified and had normal forward   peristalsis.  Osmani hemostatic agent was strategically placed in the pelvis due   to the extensive dissection required to do this case.  Total blood loss for   this case was 200 mL; however, 2200 mL of blood were removed.  The patient's   starting hematocrit is 31%.  The patient will be monitored as an outpatient and   hematocrit will be drawn accordingly if indicated.  There was no complication   during this case.  Counts were correct.  Robot was at this point undocked and   the ports were removed after the CO2 gas was released.  Ports sites were closed   using 4-0 Monocryl suture in subcuticular fashion.  The vaginal cuff was   inspected vaginally with excellent hemostasis being noted.  There were no   complications and counts were correct.      GBM/IN  dd: 03/11/2019 12:59:47 (CDT)  td:  03/11/2019 15:01:21 (CDT)  Doc ID   #[id number]  Job ID #[id number]    CC:

## 2019-03-11 NOTE — TRANSFER OF CARE
"Anesthesia Transfer of Care Note    Patient: Divina Ricci    Procedure(s) Performed: Procedure(s) (LRB):  ROBOTIC HYSTERECTOMY (N/A)    Patient location: PACU    Anesthesia Type: general    Transport from OR: Transported from OR on 6-10 L/min O2 by face mask with adequate spontaneous ventilation    Post pain: adequate analgesia    Post assessment: no apparent anesthetic complications    Post vital signs: stable    Level of consciousness: awake    Nausea/Vomiting: no nausea/vomiting    Complications: none    Transfer of care protocol was followed      Last vitals:   Visit Vitals  BP (!) 131/90 (BP Location: Left arm, Patient Position: Sitting)   Pulse 71   Temp 36.8 °C (98.3 °F) (Oral)   Resp 18   Ht 5' 7" (1.702 m)   Wt 86.8 kg (191 lb 5.8 oz)   LMP 02/22/2019 (Exact Date)   SpO2 100%   Breastfeeding? No   BMI 29.97 kg/m²     "

## 2019-03-11 NOTE — DISCHARGE INSTRUCTIONS
Discharge Instructions for Laparoscopic Hysterectomy  You had a procedure called laparoscopic hysterectomy. A surgeon removed your uterus using instruments inserted through small incisions in your abdomen. These incisions may be tender or sore. You may also have pain in your upper back or shoulders. This is from the gas used to enlarge your abdomen to allow your doctor to see inside your pelvis and perform the procedure. This pain usually goes away in a day or two. It usually takes from 1-4 weeks to recover from laparoscopic hysterectomy. Remember, though, that recovery time varies from woman to woman. Here's what you can do to speed your recovery following surgery.    Home Care   · Continue the coughing and deep breathing exercises that you learned in the hospital.  · Take your medications exactly as directed by your doctor.  · Avoid constipation.  ¨ Eat fruits, vegetables, and whole grains.  ¨ Drink 6-8 glasses of water a day, unless told to do otherwise.  ¨ Use a laxative or a mild stool softener if your doctor says it's okay.  · Shower as usual in 24 hours. Wash your incisions with mild soap and water. Pat dry.  Avoid baths, swimming pools and hot tubs until  seen by your physician for a post-op follow up.  · Don't use oils, powders, or lotions on your incisions.  · You should not have any sexual activity for six weeks.  This can cause severe bleeding. You should not insert anything into the vagina for six weeks, no tampons or douching.  · If you had both ovaries removed, report hot flashes, mood swings, and irritability to your doctor. There may be medications that can help you.    Activity  · Ask your doctor when you can start driving again. It's usually okay to drive as soon as you are free of pain and able to move comfortably from side to side. Don't drive while you are still taking narcotic pain medications.  · Ask others to help with chores and errands while you recover.  · Dont lift anything  heavier than 10 pounds for 4 weeks.  · Dont vacuum or do other strenuous activities until the doctor says it's okay.  · Walk as often as you feel able.  · Climb stairs slowly and pause after every few steps.    Follow-Up  Make a follow-up appointment as directed by our staff.     When to Call Your Doctor  Call your doctor right away if you have any of the following:  · Fever above 100.4°F (38°C) or chills  · Bright red vaginal bleeding or vaginal bleeding that soaks more than one sanitary pad per hour  · A foul smelling discharge from the vagina  · Trouble urinating or burning when you urinate  · Severe pain or bloating in your abdomen  · Redness, swelling, or drainage at your incision sites  · Shortness of breath or chest pain       Anesthesia: After Your Surgery  Youve just had surgery. During surgery, you received medication called anesthesia to keep you comfortable and pain-free. After surgery, you may experience some pain or nausea. This is common. Here are some tips for feeling better and recovering after surgery.    Going home  Your doctor or nurse will show you how to take care of yourself when you go home. He or she will also answer your questions. Have an adult family member or friend drive you home. For the first 24 hours after your surgery:    · Do not drive or use heavy equipment.  · Do not make important decisions or sign legal documents.  · Avoid alcohol.  · Have someone stay with you, if needed. He or she can watch for problems and help keep you safe.    Be sure to keep all follow-up appointments with your doctor. And rest after your procedure for as long as your doctor tells you to.    Coping with pain  If you have pain after surgery, pain medication will help you feel better. Take it as directed, before pain becomes severe. Also, ask your doctor or pharmacist about other ways to control pain, such as with heat, ice, and relaxation. And follow any other instructions your surgeon or nurse gives  you.    URINARY RETENTION  Should you experience a decrease in your urine output or are unable to urinate following surgery, this can be due to the medications given during surgery.  We recommend you going to the nearest Emergency Department.    Tips for taking pain medication  To get the best relief possible, remember these points:    · Pain medications can upset your stomach. Taking them with a little food may help.  · Most pain relievers taken by mouth need at least 20 to 30 minutes to take effect.  · Taking medication on a schedule can help you remember to take it. Try to time your medication so that you can take it before beginning an activity, such as dressing, walking, or sitting down for dinner.  · Constipation is a common side effect of pain medications. Contact your doctor before taking any medications like laxatives or stool softeners to help relieve constipation. Also ask about any dietary restrictions, because drinking lots of fluids and eating foods like fruits and vegetables that are high in fiber can also help. Remember, dont take laxatives unless your surgeon has prescribed them.  · Mixing alcohol and pain medication can cause dizziness and slow your breathing. It can even be fatal. Dont drink alcohol while taking pain medication.  · Pain medication can slow your reflexes. Dont drive or operate machinery while taking pain medication.    If your health care provider tells you to take acetaminophen to help relieve your pain, ask him or her how much you are supposed to take each day. (Acetaminophen is the generic name for Tylenol and other brand-name pain relievers.) Acetaminophen or other pain relievers may interact with your prescription medicines or other over-the-counter (OTC) drugs. Some prescription medications contain acetaminophen along with other active ingredients. Using both prescription and OTC acetaminophen for pain can cause you to overdose. The FDA recommends that you read the labels  on your OTC medications carefully. This will help you to clearly understand the list of active ingredients, dosing instructions, and any warnings. It may also help you avoid taking too much acetaminophen. If you have questions or don't understand the information, ask your pharmacist or health care provider to explain it to you before you take the OTC medication.    Managing nausea  Some people have an upset stomach after surgery. This is often due to anesthesia, pain, pain medications, or the stress of surgery. The following tips will help you manage nausea and get good nutrition as you recover. If you were on a special diet before surgery, ask your doctor if you should follow it during recovery. These tips may help:    · Dont push yourself to eat. Your body will tell you when to eat and how much.  · Start off with clear liquids and soup. They are easier to digest.  · Progress to semi-solid foods (mashed potatoes, applesauce, and gelatin) as you feel ready.  · Slowly move to solid foods. Dont eat fatty, rich, or spicy foods at first.  · Dont force yourself to have three large meals a day. Instead, eat smaller amounts more often.  · Take pain medications with a small amount of solid food, such as crackers or toast to avoid nausea.      Call your surgeon if    · You feel too sleepy, dizzy, or groggy (medication may be too strong).  · You have side effects like nausea, vomiting, or skin changes (rash, itching, or hives).     © 4415-3280 [employer]. [address] [place] [address], [place] [address]. All rights reserved. This information is not intended as a substitute for professional medical care. Always follow your healthcare professional's instructions.    PLEASE FOLLOW ANY OTHER INSTRUCTIONS PROVIDED TO YOU BY DR. CHAPARRO!

## 2019-03-11 NOTE — ANESTHESIA POSTPROCEDURE EVALUATION
"Anesthesia Post Evaluation    Patient: Divina Ricci    Procedure(s) Performed: Procedure(s) (LRB):  ROBOTIC HYSTERECTOMY (N/A)    Final Anesthesia Type: general  Patient location during evaluation: PACU  Patient participation: Yes- Able to Participate  Level of consciousness: awake and alert  Post-procedure vital signs: reviewed and stable  Pain management: adequate  Airway patency: patent  PONV status at discharge: No PONV  Anesthetic complications: no      Cardiovascular status: blood pressure returned to baseline  Respiratory status: unassisted and room air  Hydration status: euvolemic  Follow-up not needed.        Visit Vitals  /69   Pulse 72   Temp 36.9 °C (98.4 °F) (Oral)   Resp 16   Ht 5' 7" (1.702 m)   Wt 86.8 kg (191 lb 5.8 oz)   LMP 02/22/2019 (Exact Date)   SpO2 100%   Breastfeeding? No   BMI 29.97 kg/m²       Pain/Mahnaz Score: Pain Rating Prior to Med Admin: 0 (3/11/2019  1:15 PM)  Pain Rating Post Med Admin: 0 (3/11/2019 12:45 PM)  Mahnaz Score: 8 (3/11/2019  1:15 PM)        "

## 2019-03-11 NOTE — INTERVAL H&P NOTE
The patient has been examined and the H&P has been reviewed:    I concur with the findings and no changes have occurred since H&P was written.    Reviewed possible need for laparotomy due to size of the uterus. Plan is for removal of tubes with ovarian preservation as long as ovaries appear normal.    Active Hospital Problems    Diagnosis  POA    Fibroids, intramural [D25.1]  Yes      Resolved Hospital Problems   No resolved problems to display.     Esthela La MD  OBGYN - PGY 4

## 2019-03-11 NOTE — PROGRESS NOTES
Pt denies any pain or discomfort at this time. Pt is sleeping and appears to be comfortable.   Update provided to family .

## 2019-03-12 NOTE — PLAN OF CARE
Divina Andujarfabimatias Ricci has met all discharge criteria from Phase II. Vital Signs are stable, ambulating  without difficulty. Discharge instructions given, patient verbalized understanding. Discharged from facility via wheelchair in stable condition.

## 2019-03-13 ENCOUNTER — TELEPHONE (OUTPATIENT)
Dept: OBSTETRICS AND GYNECOLOGY | Facility: CLINIC | Age: 34
End: 2019-03-13

## 2019-04-11 ENCOUNTER — OFFICE VISIT (OUTPATIENT)
Dept: OBSTETRICS AND GYNECOLOGY | Facility: CLINIC | Age: 34
End: 2019-04-11
Payer: COMMERCIAL

## 2019-04-11 VITALS
DIASTOLIC BLOOD PRESSURE: 78 MMHG | SYSTOLIC BLOOD PRESSURE: 120 MMHG | BODY MASS INDEX: 30.13 KG/M2 | WEIGHT: 192 LBS | HEIGHT: 67 IN

## 2019-04-11 DIAGNOSIS — Z09 SURGERY FOLLOW-UP EXAMINATION: Primary | ICD-10-CM

## 2019-04-11 DIAGNOSIS — Z90.710 S/P LAPAROSCOPIC HYSTERECTOMY: ICD-10-CM

## 2019-04-11 PROBLEM — D25.1 FIBROIDS, INTRAMURAL: Status: RESOLVED | Noted: 2019-02-26 | Resolved: 2019-04-11

## 2019-04-11 PROBLEM — N85.2 ENLARGED UTERUS: Status: RESOLVED | Noted: 2019-02-26 | Resolved: 2019-04-11

## 2019-04-11 PROCEDURE — 99024 POSTOP FOLLOW-UP VISIT: CPT | Mod: S$GLB,,, | Performed by: OBSTETRICS & GYNECOLOGY

## 2019-04-11 PROCEDURE — 99999 PR PBB SHADOW E&M-EST. PATIENT-LVL III: CPT | Mod: PBBFAC,,, | Performed by: OBSTETRICS & GYNECOLOGY

## 2019-04-11 PROCEDURE — 99024 PR POST-OP FOLLOW-UP VISIT: ICD-10-PCS | Mod: S$GLB,,, | Performed by: OBSTETRICS & GYNECOLOGY

## 2019-04-11 PROCEDURE — 99999 PR PBB SHADOW E&M-EST. PATIENT-LVL III: ICD-10-PCS | Mod: PBBFAC,,, | Performed by: OBSTETRICS & GYNECOLOGY

## 2019-04-11 RX ORDER — LEVOTHYROXINE SODIUM 150 UG/1
150 TABLET ORAL DAILY
Refills: 0 | COMMUNITY
Start: 2019-02-06

## 2019-04-11 NOTE — PROGRESS NOTES
"CC: Postop visit    Divina Ricci is a 33 y.o. female  post-op from a 1.  Da Akash-assisted hysterectomy with bilateral salpingectomy and a Da Akash-assisted lysis of severe adhesions and resection of endometriosis  on 2019.  Patient is doing well postoperatively.  No pain.  No bowel or bladder complaints.  No fever.      The pathology report reviewed with the patient.    Past Medical History:   Diagnosis Date    Anemia     Ascites     Hypertension     Hypothyroid      Past Surgical History:   Procedure Laterality Date    BIOPSY, LIVER, TRANSJUGULAR APPROACH N/A 10/18/2018    Performed by [facility] OR 2ND [facility]    ROBOTIC HYSTERECTOMY N/A 3/11/2019    Performed by [facility], [facility] at [facility] OR    ROBOTIC LYSIS, ADHESIONS  3/11/2019    Performed by [facility] at [facility] OR    ROBOTIC SALPINGO-OOPHORECTOMY Bilateral 3/11/2019    Performed by [facility] at [facility] OR     Family History   Problem Relation Age of Onset    Autoimmune disease Father         autoimmune hepatitis    Liver disease Paternal Aunt         liver failure, s/p liver transplant     Breast cancer Paternal Aunt         onset in 50s    Colon cancer Neg Hx     Ovarian cancer Neg Hx      Social History     Tobacco Use    Smoking status: Never Smoker    Smokeless tobacco: Never Used   Substance Use Topics    Alcohol use: No    Drug use: No     OB History    Para Term  AB Living   0 0 0 0 0 0   SAB TAB Ectopic Multiple Live Births   0 0 0 0 0       /78   Ht 5' 7" (1.702 m)   Wt 87.1 kg (192 lb 0.3 oz)   BMI 30.07 kg/m²     ROS:  GENERAL: No fever, chills, fatigability or weight loss.  VULVAR: No pain, no lesions and no itching.  VAGINAL: No relaxation, no itching, no discharge, no abnormal bleeding and no lesions.  ABDOMEN: No abdominal pain. Denies nausea. Denies vomiting. No diarrhea. No constipation  BREAST: Denies pain. No lumps. No " discharge.  URINARY: No incontinence, no nocturia, no frequency and no dysuria.  CARDIOVASCULAR: No chest pain. No shortness of breath. No leg cramps.  NEUROLOGICAL: No headaches. No vision changes.    PE:   APPEARANCE: Well nourished, well developed, in no acute distress.  GENITOURINARY:  Vulva: No lesions. No erythema nor excoriations noted,Normal female genital architecture.  Urethral Meatus: Normal size and location, no lesions, no prolapse.  Urethra: No masses, tenderness, prolapse or scarring.  Vagin Moist and with rugae, no discharge, no significant cystocele or rectocele.  Vaginal cuff healing well.  Cervix: Absent  Uterus: Absent  Adnexa: No masses, tenderness or CDS nodularity.  Anus Perineum: No lesions, no relaxation, no external hemorrhoids.  Abdomen: No masses, tenderness, hernia or ascites, no hepatosplenomegaly.  Davinci port sites healed.   Skin: No rashes, lesions, ulcers, acne, hirsutism.  Peripheral/lower extremities: No edema, erythema or tenderness.  Lymphatic: No groin nodes palp.  Mental Status: Alert, oriented x 3, normal affect and mood        ICD-10-CM ICD-9-CM    1. Surgery follow-up examination Z09 V67.00    2. S/P robotic laparoscopic hysterectomy with BS and lysis of adhesions Z90.710 V88.01        Plan:  Patient cleared for routine activity/exercise.    Continue pelvic rest x 10 weeks.  Patient verbalized understanding.    Annual exam in one year.    Patient instructed to monitor for menopausal symptoms/hot flashes.    Keep regular PCP follow-up    Benedicto Murphy IV, MD

## 2019-09-16 NOTE — TELEPHONE ENCOUNTER
----- Message from Nancy Lam MD sent at 1/26/2018  9:53 AM CST -----  Pl inform patient the acites fluid cytology report from 10/26/17 was sent to me today, not sure why it's taken so long, but good news is: there were no cancer cells in the fluid.  
MA Attempted to call patient she is unable to reached left her VM to please give us a callback. HENRY  
patient refused

## 2021-07-16 ENCOUNTER — IMMUNIZATION (OUTPATIENT)
Dept: PRIMARY CARE CLINIC | Facility: CLINIC | Age: 36
End: 2021-07-16

## 2021-07-16 DIAGNOSIS — Z23 NEED FOR VACCINATION: Primary | ICD-10-CM

## 2021-07-16 PROCEDURE — 0001A COVID-19, MRNA, LNP-S, PF, 30 MCG/0.3 ML DOSE VACCINE: ICD-10-PCS | Mod: CV19,S$GLB,, | Performed by: INTERNAL MEDICINE

## 2021-07-16 PROCEDURE — 91300 COVID-19, MRNA, LNP-S, PF, 30 MCG/0.3 ML DOSE VACCINE: ICD-10-PCS | Mod: S$GLB,,, | Performed by: INTERNAL MEDICINE

## 2021-07-16 PROCEDURE — 0001A COVID-19, MRNA, LNP-S, PF, 30 MCG/0.3 ML DOSE VACCINE: CPT | Mod: CV19,S$GLB,, | Performed by: INTERNAL MEDICINE

## 2021-07-16 PROCEDURE — 91300 COVID-19, MRNA, LNP-S, PF, 30 MCG/0.3 ML DOSE VACCINE: CPT | Mod: S$GLB,,, | Performed by: INTERNAL MEDICINE

## 2021-08-06 ENCOUNTER — IMMUNIZATION (OUTPATIENT)
Dept: PRIMARY CARE CLINIC | Facility: CLINIC | Age: 36
End: 2021-08-06
Payer: COMMERCIAL

## 2021-08-06 DIAGNOSIS — Z23 NEED FOR VACCINATION: Primary | ICD-10-CM

## 2021-08-06 PROCEDURE — 91300 COVID-19, MRNA, LNP-S, PF, 30 MCG/0.3 ML DOSE VACCINE: CPT | Mod: S$GLB,,, | Performed by: INTERNAL MEDICINE

## 2021-08-06 PROCEDURE — 0002A COVID-19, MRNA, LNP-S, PF, 30 MCG/0.3 ML DOSE VACCINE: ICD-10-PCS | Mod: CV19,S$GLB,, | Performed by: INTERNAL MEDICINE

## 2021-08-06 PROCEDURE — 91300 COVID-19, MRNA, LNP-S, PF, 30 MCG/0.3 ML DOSE VACCINE: ICD-10-PCS | Mod: S$GLB,,, | Performed by: INTERNAL MEDICINE

## 2021-08-06 PROCEDURE — 0002A COVID-19, MRNA, LNP-S, PF, 30 MCG/0.3 ML DOSE VACCINE: CPT | Mod: CV19,S$GLB,, | Performed by: INTERNAL MEDICINE

## 2021-08-18 ENCOUNTER — PATIENT MESSAGE (OUTPATIENT)
Dept: RESEARCH | Facility: HOSPITAL | Age: 36
End: 2021-08-18

## (undated) DEVICE — SUT VICRYL PLUS 0 CT1 36IN

## (undated) DEVICE — TIP RUMI KOH-EFFICIENT 3.5

## (undated) DEVICE — UNDERGLOVE BIOGEL PI SZ 6.5 LF

## (undated) DEVICE — INSERT CUSHIONPRONE VIEW LARGE

## (undated) DEVICE — SOL WATER STRL IRR 1000ML

## (undated) DEVICE — COVER TIP CURVED SCISSORS XI

## (undated) DEVICE — DRAPE ARM DAVINCI XI

## (undated) DEVICE — SOL NS 1000CC

## (undated) DEVICE — GLOVE BIOGEL SKINSENSE PI 6.5

## (undated) DEVICE — IRRIGATOR ENDOSCOPY DISP.

## (undated) DEVICE — Device

## (undated) DEVICE — DRAPE COLUMN DAVINCI XI

## (undated) DEVICE — SEE MEDLINE ITEM 156923

## (undated) DEVICE — APPLICATOR ARISTA FLEX XL

## (undated) DEVICE — SEAL UNIVERSAL 5MM-8MM XI

## (undated) DEVICE — PAD PERINEAL SUPINE

## (undated) DEVICE — GLOVE BIOGEL SKINSENSE PI 7.5

## (undated) DEVICE — SUT MCRYL PLUS 4-0 PS2 27IN

## (undated) DEVICE — SEE MEDLINE ITEM 157110

## (undated) DEVICE — DEVICE ANC SW STAT FOLEY 6-24

## (undated) DEVICE — SET TRI-LUMEN FILTERED TUBE

## (undated) DEVICE — SUT VICRYL+ 27 UR-6 VIOL

## (undated) DEVICE — MANIPULATOR TIP RUMI ORANGE

## (undated) DEVICE — OBTURATOR BLADELESS 8MM XI CLR

## (undated) DEVICE — POSITIONER HEAD ADULT

## (undated) DEVICE — DRAPE INCISE IOBAN 2 23X17IN

## (undated) DEVICE — POWDER ARISTA AH 3G

## (undated) DEVICE — DRESSING LEUKOPLAST FLEX 1X3IN

## (undated) DEVICE — CLOSURE STERI STRIP .5X4IN TAN

## (undated) DEVICE — SUT VICRYL 0 27 CT-2

## (undated) DEVICE — SOL CLEARIFY VISUALIZATION LAP

## (undated) DEVICE — SOL ELECTROLUBE ANTI-STIC

## (undated) DEVICE — ELECTRODE REM PLYHSV RETURN 9

## (undated) DEVICE — SOL STRL WATER INJ 1000ML BG

## (undated) DEVICE — JELLY SURGILUBE 5GR

## (undated) DEVICE — NDL INSUF ULTRA VERESS 120MM

## (undated) DEVICE — CAP IV DUAL FUNCTION

## (undated) DEVICE — KIT WING PAD POSITIONING